# Patient Record
Sex: FEMALE | Race: WHITE | NOT HISPANIC OR LATINO | Employment: FULL TIME | URBAN - NONMETROPOLITAN AREA
[De-identification: names, ages, dates, MRNs, and addresses within clinical notes are randomized per-mention and may not be internally consistent; named-entity substitution may affect disease eponyms.]

---

## 2017-02-19 ENCOUNTER — TELEPHONE (OUTPATIENT)
Dept: URGENT CARE | Facility: CLINIC | Age: 47
End: 2017-02-19

## 2017-02-19 PROCEDURE — 87481 CANDIDA DNA AMP PROBE: CPT | Performed by: NURSE PRACTITIONER

## 2017-02-19 PROCEDURE — 87512 GARDNER VAG DNA QUANT: CPT | Performed by: NURSE PRACTITIONER

## 2017-02-19 PROCEDURE — 87661 TRICHOMONAS VAGINALIS AMPLIF: CPT | Performed by: NURSE PRACTITIONER

## 2017-05-01 ENCOUNTER — OFFICE VISIT (OUTPATIENT)
Dept: OBSTETRICS AND GYNECOLOGY | Facility: CLINIC | Age: 47
End: 2017-05-01

## 2017-05-01 ENCOUNTER — APPOINTMENT (OUTPATIENT)
Dept: LAB | Facility: HOSPITAL | Age: 47
End: 2017-05-01

## 2017-05-01 VITALS
BODY MASS INDEX: 41.48 KG/M2 | WEIGHT: 249 LBS | HEIGHT: 65 IN | DIASTOLIC BLOOD PRESSURE: 77 MMHG | SYSTOLIC BLOOD PRESSURE: 141 MMHG

## 2017-05-01 DIAGNOSIS — N91.2 AMENORRHEA: Primary | ICD-10-CM

## 2017-05-01 DIAGNOSIS — Z12.31 SCREENING MAMMOGRAM, ENCOUNTER FOR: ICD-10-CM

## 2017-05-01 DIAGNOSIS — Z01.419 ENCOUNTER FOR WELL WOMAN EXAM: ICD-10-CM

## 2017-05-01 LAB
T4 FREE SERPL-MCNC: 1.22 NG/DL (ref 0.78–2.19)
TSH SERPL DL<=0.05 MIU/L-ACNC: 2.02 MIU/ML (ref 0.46–4.68)

## 2017-05-01 PROCEDURE — 87624 HPV HI-RISK TYP POOLED RSLT: CPT | Performed by: OBSTETRICS & GYNECOLOGY

## 2017-05-01 PROCEDURE — 83002 ASSAY OF GONADOTROPIN (LH): CPT | Performed by: OBSTETRICS & GYNECOLOGY

## 2017-05-01 PROCEDURE — 36415 COLL VENOUS BLD VENIPUNCTURE: CPT | Performed by: OBSTETRICS & GYNECOLOGY

## 2017-05-01 PROCEDURE — 88142 CYTOPATH C/V THIN LAYER: CPT | Performed by: OBSTETRICS & GYNECOLOGY

## 2017-05-01 PROCEDURE — 84443 ASSAY THYROID STIM HORMONE: CPT | Performed by: OBSTETRICS & GYNECOLOGY

## 2017-05-01 PROCEDURE — 82670 ASSAY OF TOTAL ESTRADIOL: CPT | Performed by: OBSTETRICS & GYNECOLOGY

## 2017-05-01 PROCEDURE — 86376 MICROSOMAL ANTIBODY EACH: CPT | Performed by: OBSTETRICS & GYNECOLOGY

## 2017-05-01 PROCEDURE — 84439 ASSAY OF FREE THYROXINE: CPT | Performed by: OBSTETRICS & GYNECOLOGY

## 2017-05-01 PROCEDURE — 83001 ASSAY OF GONADOTROPIN (FSH): CPT | Performed by: OBSTETRICS & GYNECOLOGY

## 2017-05-01 PROCEDURE — 99386 PREV VISIT NEW AGE 40-64: CPT | Performed by: OBSTETRICS & GYNECOLOGY

## 2017-05-02 LAB
ESTRADIOL SERPL HS-MCNC: 131.3 PG/ML
FSH SERPL-ACNC: 4 MIU/ML
LH SERPL-ACNC: 4.29 MIU/ML
THYROPEROXIDASE AB SERPL-ACNC: 12 IU/ML (ref 0–34)

## 2017-05-03 ENCOUNTER — TELEPHONE (OUTPATIENT)
Dept: ENDOCRINOLOGY | Facility: CLINIC | Age: 47
End: 2017-05-03

## 2017-05-03 LAB
LAB AP CASE REPORT: NORMAL
LAB AP GYN ADDITIONAL INFORMATION: NORMAL
LAB AP GYN OTHER FINDINGS: NORMAL
Lab: NORMAL
PATH INTERP SPEC-IMP: NORMAL
STAT OF ADQ CVX/VAG CYTO-IMP: NORMAL

## 2017-05-08 LAB — HPV I/H RISK 4 DNA CVX QL PROBE+SIG AMP: NEGATIVE

## 2017-05-24 RX ORDER — LOSARTAN POTASSIUM 100 MG/1
TABLET ORAL
Qty: 90 TABLET | Refills: 2 | Status: SHIPPED | OUTPATIENT
Start: 2017-05-24 | End: 2018-01-25 | Stop reason: SDUPTHER

## 2017-05-24 RX ORDER — ATORVASTATIN CALCIUM 40 MG/1
TABLET, FILM COATED ORAL
Qty: 90 TABLET | Refills: 2 | Status: SHIPPED | OUTPATIENT
Start: 2017-05-24 | End: 2018-10-29 | Stop reason: SDUPTHER

## 2017-05-24 RX ORDER — METOPROLOL SUCCINATE 25 MG/1
TABLET, EXTENDED RELEASE ORAL
Qty: 90 TABLET | Refills: 2 | Status: SHIPPED | OUTPATIENT
Start: 2017-05-24 | End: 2018-10-29 | Stop reason: SDUPTHER

## 2017-05-30 ENCOUNTER — OFFICE VISIT (OUTPATIENT)
Dept: OBSTETRICS AND GYNECOLOGY | Facility: CLINIC | Age: 47
End: 2017-05-30

## 2017-05-30 VITALS — BODY MASS INDEX: 41.65 KG/M2 | WEIGHT: 250 LBS | HEIGHT: 65 IN

## 2017-05-30 DIAGNOSIS — Z30.430 ENCOUNTER FOR IUD INSERTION: Primary | ICD-10-CM

## 2017-05-30 LAB
B-HCG UR QL: NEGATIVE
INTERNAL NEGATIVE CONTROL: NEGATIVE
INTERNAL POSITIVE CONTROL: POSITIVE
Lab: NORMAL

## 2017-05-30 PROCEDURE — 81025 URINE PREGNANCY TEST: CPT | Performed by: OBSTETRICS & GYNECOLOGY

## 2017-05-30 PROCEDURE — 58300 INSERT INTRAUTERINE DEVICE: CPT | Performed by: OBSTETRICS & GYNECOLOGY

## 2017-06-26 ENCOUNTER — OFFICE VISIT (OUTPATIENT)
Dept: OBSTETRICS AND GYNECOLOGY | Facility: CLINIC | Age: 47
End: 2017-06-26

## 2017-06-26 VITALS
BODY MASS INDEX: 42.65 KG/M2 | WEIGHT: 256 LBS | SYSTOLIC BLOOD PRESSURE: 143 MMHG | HEIGHT: 65 IN | DIASTOLIC BLOOD PRESSURE: 79 MMHG

## 2017-06-26 DIAGNOSIS — Z30.431 IUD CHECK UP: Primary | ICD-10-CM

## 2017-06-26 PROCEDURE — 99212 OFFICE O/P EST SF 10 MIN: CPT | Performed by: OBSTETRICS & GYNECOLOGY

## 2017-06-26 NOTE — PROGRESS NOTES
"Subjective     Chief Complaint   Patient presents with   • Follow-up     Jazmine Rodriguez is a 47 y.o.  presents today for an IUD string check.  No complaints or concerns.  Spotting this morning.  No issues with intercourse.      No changes to PMH, PSH, family or social history since last visit.  No new medications or allergies.     Objective      /79  Ht 65\" (165.1 cm)  Wt 256 lb (116 kg)  BMI 42.6 kg/m2    Physical Exam  General:   Appears stated age, AAOx3, NAD   Pelvis: External genitalia - NEFG  Urethra - normal appearing, no urethra caruncle or prolapse  Vagina - normal appearing vagina, no discharge or bleeding noted, no lesions.   Cervix - Normal appearing cervix, IUD strings not initially visualized, with cytobrush, IUD strings seen right inside internal os   Neurologic: CN II - XII grossly intact       TVUS - IUD seen in endometrial cavity, near fundus    Assessment/Plan     ASSESSMENT  1. IUD check up        PLAN  1. IUD check up  - IUD strings barley visualized; bedside TVUS confirmed placement intrauterine.    - RTC in 1 year for annual exam or earlier if indicated    Emmy Tapia MD  2017           "

## 2017-08-07 RX ORDER — INSULIN HUMAN 500 [IU]/ML
INJECTION, SOLUTION SUBCUTANEOUS
Qty: 200 ML | Refills: 4 | Status: SHIPPED | OUTPATIENT
Start: 2017-08-07 | End: 2018-09-12 | Stop reason: SDUPTHER

## 2017-09-11 ENCOUNTER — APPOINTMENT (OUTPATIENT)
Dept: LAB | Facility: HOSPITAL | Age: 47
End: 2017-09-11

## 2017-09-11 PROCEDURE — 82570 ASSAY OF URINE CREATININE: CPT | Performed by: INTERNAL MEDICINE

## 2017-09-11 PROCEDURE — 84156 ASSAY OF PROTEIN URINE: CPT | Performed by: INTERNAL MEDICINE

## 2017-09-11 PROCEDURE — 85025 COMPLETE CBC W/AUTO DIFF WBC: CPT | Performed by: INTERNAL MEDICINE

## 2017-09-11 PROCEDURE — 83036 HEMOGLOBIN GLYCOSYLATED A1C: CPT | Performed by: INTERNAL MEDICINE

## 2017-09-11 PROCEDURE — 82306 VITAMIN D 25 HYDROXY: CPT | Performed by: INTERNAL MEDICINE

## 2017-09-11 PROCEDURE — 80061 LIPID PANEL: CPT | Performed by: INTERNAL MEDICINE

## 2017-09-11 PROCEDURE — 36415 COLL VENOUS BLD VENIPUNCTURE: CPT | Performed by: INTERNAL MEDICINE

## 2017-09-11 PROCEDURE — 83516 IMMUNOASSAY NONANTIBODY: CPT | Performed by: INTERNAL MEDICINE

## 2017-09-11 PROCEDURE — 82607 VITAMIN B-12: CPT | Performed by: INTERNAL MEDICINE

## 2017-09-11 PROCEDURE — 84443 ASSAY THYROID STIM HORMONE: CPT | Performed by: INTERNAL MEDICINE

## 2017-09-11 PROCEDURE — 80053 COMPREHEN METABOLIC PANEL: CPT | Performed by: INTERNAL MEDICINE

## 2017-09-11 PROCEDURE — 82043 UR ALBUMIN QUANTITATIVE: CPT | Performed by: INTERNAL MEDICINE

## 2017-09-12 ENCOUNTER — OFFICE VISIT (OUTPATIENT)
Dept: ENDOCRINOLOGY | Facility: CLINIC | Age: 47
End: 2017-09-12

## 2017-09-12 VITALS
BODY MASS INDEX: 40.55 KG/M2 | WEIGHT: 237.5 LBS | HEART RATE: 72 BPM | SYSTOLIC BLOOD PRESSURE: 130 MMHG | HEIGHT: 64 IN | DIASTOLIC BLOOD PRESSURE: 72 MMHG

## 2017-09-12 DIAGNOSIS — E55.9 VITAMIN D DEFICIENCY: ICD-10-CM

## 2017-09-12 DIAGNOSIS — E10.8 TYPE 1 DIABETES MELLITUS WITH COMPLICATION (HCC): Primary | ICD-10-CM

## 2017-09-12 DIAGNOSIS — E78.2 MIXED DIABETIC HYPERLIPIDEMIA ASSOCIATED WITH TYPE 1 DIABETES MELLITUS (HCC): Primary | ICD-10-CM

## 2017-09-12 DIAGNOSIS — E10.3399 TYPE 1 DIABETES MELLITUS WITH MODERATE NONPROLIFERATIVE RETINOPATHY, MACULAR EDEMA PRESENCE UNSPECIFIED, UNSPECIFIED LATERALITY (HCC): ICD-10-CM

## 2017-09-12 DIAGNOSIS — E11.59 HYPERTENSION ASSOCIATED WITH DIABETES (HCC): ICD-10-CM

## 2017-09-12 DIAGNOSIS — I15.2 HYPERTENSION ASSOCIATED WITH DIABETES (HCC): ICD-10-CM

## 2017-09-12 DIAGNOSIS — E04.2 NONTOXIC MULTINODULAR GOITER: ICD-10-CM

## 2017-09-12 DIAGNOSIS — E53.8 B12 DEFICIENCY: ICD-10-CM

## 2017-09-12 DIAGNOSIS — E10.69 MIXED DIABETIC HYPERLIPIDEMIA ASSOCIATED WITH TYPE 1 DIABETES MELLITUS (HCC): Primary | ICD-10-CM

## 2017-09-12 LAB
ALBUMIN SERPL-MCNC: 3.9 G/DL (ref 3.4–4.8)
ALBUMIN/GLOB SERPL: 1.2 G/DL (ref 1.1–1.8)
ALP SERPL-CCNC: 70 U/L (ref 38–126)
ALT SERPL W P-5'-P-CCNC: 30 U/L (ref 9–52)
ANION GAP SERPL CALCULATED.3IONS-SCNC: 10 MMOL/L (ref 5–15)
AST SERPL-CCNC: 27 U/L (ref 14–36)
BILIRUB SERPL-MCNC: 1.1 MG/DL (ref 0.2–1.3)
BUN BLD-MCNC: 18 MG/DL (ref 7–21)
BUN/CREAT SERPL: 15.4 (ref 7–25)
CALCIUM SPEC-SCNC: 9.3 MG/DL (ref 8.4–10.2)
CHLORIDE SERPL-SCNC: 101 MMOL/L (ref 95–110)
CO2 SERPL-SCNC: 27 MMOL/L (ref 22–31)
CREAT BLD-MCNC: 1.17 MG/DL (ref 0.5–1)
GFR SERPL CREATININE-BSD FRML MDRD: 50 ML/MIN/1.73 (ref 60–135)
GLOBULIN UR ELPH-MCNC: 3.3 GM/DL (ref 2.3–3.5)
GLUCOSE BLD-MCNC: 109 MG/DL (ref 60–100)
GLUCOSE BLDC GLUCOMTR-MCNC: 149 MG/DL (ref 70–130)
POTASSIUM BLD-SCNC: 4.2 MMOL/L (ref 3.5–5.1)
PROT SERPL-MCNC: 7.2 G/DL (ref 6.3–8.6)
SODIUM BLD-SCNC: 138 MMOL/L (ref 137–145)

## 2017-09-12 PROCEDURE — PTNOCHG PR CUSTOM PT NO CHARGE VISIT: Performed by: INTERNAL MEDICINE

## 2017-09-12 PROCEDURE — 95250 CONT GLUC MNTR PHYS/QHP EQP: CPT | Performed by: INTERNAL MEDICINE

## 2017-09-12 PROCEDURE — 99214 OFFICE O/P EST MOD 30 MIN: CPT | Performed by: INTERNAL MEDICINE

## 2017-09-12 NOTE — PROGRESS NOTES
Jazmine Rodriguez is a 47 y.o. female seen by diabetes educator 09/12/2017 for insertion of Joanie diagnostic continuous glucose monitor.     Sensor inserted in office. Instructed patient to wear sensor up to 14 days. Patient is to return to clinic in 2 weeks for sensor removal and results. Instructed patient to remove sensor if he has a CT scan, MRI, or X-ray, or if skin irritation occurs.     Aissatou Young MS, RD, LD, CDE

## 2017-09-12 NOTE — PROGRESS NOTES
Jazmine Rodriguez is a 47 y.o. female who presents for  evaluation of   Chief Complaint   Patient presents with   • Diabetes         Primary Care / Referring Provider  Roderick Allred MD      History of Present Illness  followup for thyroid nodule and  diabetes type 1    In regards to thyroid nodule     Duration - diagnosed 40s  Timing - constant  Quality -   controlled  Severity - moderate    Previous Imaging -  last US from Nov 2016 right sup 1.1. cm thyroid nodule , stable when compared to 2013    TSH - Normal     She refers Hashimoto's , TPO ab neg . She is euthyroid.     ---    Type 1 DM    Duration since 10 y   Timing constant  Quality uncontrolled  Severity high - diabetic retinopathy and ckd  Alleviating Factors - Insulin, through insulin pump   Associated symptoms - fatigue,    BG readings, pump download - variable bg readings      now exercising, had vitreous hemorrhage related in part to aspirin        Past Medical History:   Diagnosis Date   • Acute otitis media with effusion    • Acute renal failure syndrome    • Acute sinusitis    • Acute upper respiratory infection, unspecified    • Allergic rhinitis    • Astigmatism    • Borderline glaucoma    • 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    • Diabetic retinopathy     prob PDR OD   • Dyslipidemia    • Essential hypertension    • GERD (gastroesophageal reflux disease)    • Non-toxic multinodular goiter    • Nonproliferative diabetic retinopathy     possible occult PDR OD      • Puncture wound without foreign body, right lower leg, initial encounter    • Referred otalgia    • Sinus headache    • Type 1 diabetes mellitus    • Type 2 diabetes mellitus    • Upper respiratory infection    • Vitreous hemorrhage     s/p vitrectomy OD, doing well        Family History   Problem Relation Age of Onset   • Cancer Paternal Grandfather      Colorectal Cancer   • Diabetes Other    • Hypertension Other    • Thyroid disease Other    •  Breast cancer Mother    • Breast cancer Paternal Aunt      Social History   Substance Use Topics   • Smoking status: Never Smoker   • Smokeless tobacco: Never Used   • Alcohol use Not on file         Current Outpatient Prescriptions:   •  albuterol (PROVENTIL HFA;VENTOLIN HFA) 108 (90 BASE) MCG/ACT inhaler, Inhale 2 puffs Every 6 (Six) Hours As Needed for Wheezing., Disp: 1 inhaler, Rfl: 0  •  atorvastatin (LIPITOR) 40 MG tablet, TAKE ONE TABLET BY MOUTH DAILY, Disp: 90 tablet, Rfl: 2  •  azithromycin (ZITHROMAX) 250 MG tablet, Take 2 tablets the first day, then 1 tablet daily for 4 days., Disp: 6 tablet, Rfl: 0  •  HUMULIN R 500 UNIT/ML CONCENTRATED injection, INJECT 1 TO 1.3 ML VIA PUMP ONCE DAILY, Disp: 200 mL, Rfl: 4  •  insulin regular (HUMULIN R) 500 UNIT/ML CONCENTRATED injection, Inject  under the skin 3 (Three) Times a Day Before Meals., Disp: , Rfl:   •  insulin regular (HumuLIN R) 500 UNIT/ML patient supplied pump, Inject 500 Units under the skin Continuous. Per Insulin Pump, Disp: , Rfl:   •  levonorgestrel (MIRENA) 20 MCG/24HR IUD, 1 each by Intrauterine route 1 (One) Time., Disp: , Rfl:   •  losartan (COZAAR) 100 MG tablet, TAKE ONE TABLET BY MOUTH DAILY, Disp: 90 tablet, Rfl: 2  •  metoprolol succinate XL (TOPROL-XL) 25 MG 24 hr tablet, TAKE ONE TABLET BY MOUTH DAILY, Disp: 90 tablet, Rfl: 2    Review of Systems    Review of Systems   Constitutional: Negative for activity change, appetite change, chills, diaphoresis, fatigue, fever and unexpected weight change.   HENT: Negative for congestion, drooling, ear discharge, ear pain, facial swelling, mouth sores, nosebleeds, postnasal drip, sinus pressure, sneezing, sore throat, tinnitus, trouble swallowing and voice change.    Eyes: Negative.  Negative for photophobia, pain, discharge, redness and itching.   Respiratory: Negative.  Negative for apnea, cough, choking, chest tightness, shortness of breath, wheezing and stridor.    Cardiovascular: Negative.   "Negative for chest pain, palpitations and leg swelling.   Gastrointestinal: Negative.  Negative for abdominal distention, abdominal pain, constipation, diarrhea, nausea and vomiting.   Endocrine: Negative.  Negative for cold intolerance, heat intolerance, polydipsia, polyphagia and polyuria.   Genitourinary: Negative for difficulty urinating, dysuria, flank pain and frequency.   Musculoskeletal: Negative for arthralgias, back pain, gait problem, joint swelling, myalgias, neck pain and neck stiffness.   Skin: Negative for color change, pallor, rash and wound.   Allergic/Immunologic: Negative for environmental allergies, food allergies and immunocompromised state.   Neurological: Negative for dizziness, tremors, seizures, syncope, facial asymmetry, speech difficulty, weakness, light-headedness, numbness and headaches.   Hematological: Negative for adenopathy. Does not bruise/bleed easily.   Psychiatric/Behavioral: Negative for agitation, behavioral problems, confusion, decreased concentration, dysphoric mood, hallucinations, self-injury, sleep disturbance and suicidal ideas. The patient is not nervous/anxious and is not hyperactive.         Objective:     /72 (BP Location: Right arm, Patient Position: Sitting, Cuff Size: Adult)  Pulse 72  Ht 64\" (162.6 cm)  Wt 237 lb 8 oz (108 kg)  BMI 40.77 kg/m2    Physical Exam   Constitutional: She is oriented to person, place, and time. She appears well-developed.   HENT:   Head: Normocephalic.   Right Ear: External ear normal.   Left Ear: External ear normal.   Nose: Nose normal.   Eyes: Conjunctivae and EOM are normal. No scleral icterus.   Neck: Normal range of motion. Neck supple. No tracheal deviation present. No thyromegaly present.   Cardiovascular: Normal rate, regular rhythm, normal heart sounds and intact distal pulses.  Exam reveals no gallop and no friction rub.    No murmur heard.  Pulmonary/Chest: Effort normal and breath sounds normal. No stridor. No " respiratory distress. She has no wheezes. She has no rales. She exhibits no tenderness.   Abdominal: Soft. Bowel sounds are normal. She exhibits no distension and no mass. There is no tenderness. There is no rebound and no guarding.   Musculoskeletal: Normal range of motion. She exhibits no tenderness or deformity.   Lymphadenopathy:     She has no cervical adenopathy.   Neurological: She is alert and oriented to person, place, and time. She displays normal reflexes. She exhibits normal muscle tone. Coordination normal.   Skin: No rash noted. No erythema. No pallor.   Psychiatric: She has a normal mood and affect. Her behavior is normal. Judgment and thought content normal.       Lab Review    Results for orders placed or performed in visit on 09/12/17   POC Glucose Fingerstick   Result Value Ref Range    Glucose 149 (A) 70 - 130 mg/dL           Assessment/Plan       ICD-10-CM ICD-9-CM   1. Mixed diabetic hyperlipidemia associated with type 1 diabetes mellitus E10.69 250.81    E78.2 272.2   2. Type 1 diabetes mellitus with moderate nonproliferative retinopathy, macular edema presence unspecified, unspecified laterality E10.3399 250.51     362.05   3. Nontoxic multinodular goiter E04.2 241.1   4. Hypertension associated with diabetes E11.59 250.80    I10 401.9   5. B12 deficiency E53.8 266.2   6. Vitamin D deficiency E55.9 268.9       Glycemic Management:      Lab Results   Component Value Date    HGBA1C 8.6 (H) 09/11/2017    HGBA1C 9.0 (H) 10/28/2016    HGBA1C 9.1 (H) 06/20/2016     Lab Results   Component Value Date    MICROALBUR 1.4 09/11/2017    LDLCALC 109 10/28/2016    CREATININE 1.1 (H) 10/28/2016           pump with  U 500 insulin     Basal insulin     MN 0.55 - decrease to 0.525 - 0.5- 0.45  2:30 a.m. 0.65 decrease to 0.625- 0.6 - 0.575- 0.5  7 a.m. 0.5 - 0.475-0.5      Sensitivity 90 - 70     carb ratio  u500 at 20 --- change to 18 - 16 - 15 - 12 - 10     now do 16 up to 5 p.m. - keep -14 - 13 - 13 -11  at  5 p.m. 19 -- - change to 16 -14 - 13 - 11     sens 90 -75- 70     she has ARLENE and CPAP may help to decrease overnight BG rise    symlin not effective, she stopped  victoza resulted in profound hypoglycemia     start farxiga 5 mg daily - not covered- on invokana - not taking       sensor not accurate - but will talk to Sara      main problem is post meals rise , we will try afrezza, there is lipohypertrophy     Exercising and little carbs    So small bolus   Lipid Management    Lab Results   Component Value Date    CHOL 152 09/11/2017     Lab Results   Component Value Date    TRIG 96 09/11/2017    TRIG 88 10/28/2016    TRIG 129 06/20/2016     Lab Results   Component Value Date    HDL 34 (L) 09/11/2017    HDL 51 (L) 10/28/2016    HDL 31 (L) 06/20/2016     Lab Results   Component Value Date    LDLDIRECT 85 09/11/2017         Lipitor 40 mg tablet, 1 tablet(s) by mouth daily taking     continue    Lipids at goal    Blood Pressure Management:     Wt Readings from Last 3 Encounters:   09/12/17 237 lb 8 oz (108 kg)   07/19/17 248 lb 2 oz (113 kg)   06/26/17 256 lb (116 kg)     Temp Readings from Last 3 Encounters:   07/19/17 100.1 °F (37.8 °C) (Oral)   03/16/17 98.1 °F (36.7 °C)   02/19/17 98 °F (36.7 °C) (Tympanic)     BP Readings from Last 3 Encounters:   09/12/17 130/72   07/19/17 122/60   06/26/17 143/79     Pulse Readings from Last 3 Encounters:   09/12/17 72   07/19/17 88   03/16/17 89         cozaar 100 mg daily     metoprolol succinate ER 25 mg tablet,extended release 24 hr, 1 tab daily taking   --    no hctz since Acute on chronic kidney disease    Microvascular Complication Monitoring:  No Microalbuminuria,  nephropathy ckd stage III    no neuropathy    ==    had vitreous hemorrhage, stop aspirin   Immunizations:  Last influenza immunization 2015, Last pneumococcal immunization has had pneumovax before age 65  Preventive Care:  Patient is not smoking  Weight Related:  Obesity, Counseled on nutrition, Counseled on  physical activity  Sleep apnea positive    she will have cpap titration    should use compression stockings    --  start contrave - not effective    exercising and limiting carbs but still not able to lose weight    start saxenda - not covered      Uncontrolled diabetes  Hypoglycemia and Hyperglycemia    Insertion of continuous glucose monitor to define pattern    The continuous glucose monitor that was inserted is a amaury glucose monitor          [facility]   nl vit D   Other Diabetes Related Aspects  Hashimoto's - no since tpo neg  MNG with subcm nodules    US personally reviewed , stable from May 2013 to Nov 2016       Lab Results   Component Value Date    TSH 1.720 09/11/2017             Lab Results   Component Value Date    [id number] 387 09/11/2017         5-14 no celiac disease  all rx to refill    3 months at a [facility]      I reviewed and summarized records from Roderick Allred MD from 2016 and I reviewed / ordered labs.     Orders Placed This Encounter   Procedures   • Comprehensive Metabolic Panel   • POC Glucose Fingerstick         A copy of my note was sent to Roderick Allred MD    Please see my above opinion and suggestions.

## 2017-09-15 ENCOUNTER — OFFICE VISIT (OUTPATIENT)
Dept: ENDOCRINOLOGY | Facility: CLINIC | Age: 47
End: 2017-09-15

## 2017-09-15 DIAGNOSIS — E10.8 TYPE 1 DIABETES MELLITUS WITH COMPLICATION (HCC): Primary | ICD-10-CM

## 2017-09-15 PROCEDURE — PTNOCHG PR CUSTOM PT NO CHARGE VISIT: Performed by: INTERNAL MEDICINE

## 2017-09-15 NOTE — PROGRESS NOTES
Jazmine Rodriguez is a 47 y.o. female seen by diabetes educator 09/15/2017 for insertion of Joanie diagnostic continuous glucose monitor. This was a second insertion because first sensor came off.     Sensor inserted in office. Instructed patient to wear sensor up to 14 days. Patient is to return to clinic in 2 weeks for sensor removal and results. Instructed patient to remove sensor if he has a CT scan, MRI, or X-ray, or if skin irritation occurs.     Aissatou Young MS, RD, LD, CDE

## 2017-09-26 ENCOUNTER — OFFICE VISIT (OUTPATIENT)
Dept: ENDOCRINOLOGY | Facility: CLINIC | Age: 47
End: 2017-09-26

## 2017-09-26 VITALS
BODY MASS INDEX: 40.97 KG/M2 | HEIGHT: 64 IN | WEIGHT: 240 LBS | SYSTOLIC BLOOD PRESSURE: 118 MMHG | DIASTOLIC BLOOD PRESSURE: 80 MMHG | HEART RATE: 68 BPM

## 2017-09-26 DIAGNOSIS — E10.3399 TYPE 1 DIABETES MELLITUS WITH MODERATE NONPROLIFERATIVE RETINOPATHY, MACULAR EDEMA PRESENCE UNSPECIFIED, UNSPECIFIED LATERALITY (HCC): Primary | ICD-10-CM

## 2017-09-26 DIAGNOSIS — E11.59 HYPERTENSION ASSOCIATED WITH DIABETES (HCC): ICD-10-CM

## 2017-09-26 DIAGNOSIS — I15.2 HYPERTENSION ASSOCIATED WITH DIABETES (HCC): ICD-10-CM

## 2017-09-26 DIAGNOSIS — E55.9 VITAMIN D DEFICIENCY: ICD-10-CM

## 2017-09-26 DIAGNOSIS — E04.2 NONTOXIC MULTINODULAR GOITER: ICD-10-CM

## 2017-09-26 PROCEDURE — 95251 CONT GLUC MNTR ANALYSIS I&R: CPT | Performed by: NURSE PRACTITIONER

## 2017-09-26 PROCEDURE — 99214 OFFICE O/P EST MOD 30 MIN: CPT | Performed by: NURSE PRACTITIONER

## 2017-09-26 NOTE — PROGRESS NOTES
Subjective    Jazmine Rodriguez is a 47 y.o. female. she is here today for follow-up.    History of Present Illness        History of Present Illness      followup for thyroid nodule and  diabetes type 1     In regards to thyroid nodule      Duration - diagnosed 40s  Timing - constant  Quality -   controlled  Severity - moderate     Previous Imaging -  last US from Nov 2016 right sup 1.1. cm thyroid nodule , stable when compared to 2013     TSH - Normal      She refers Hashimoto's , TPO ab neg . She is euthyroid.      ---     Type 1 DM     Duration since 10 y   Timing constant  Quality uncontrolled  Severity high - diabetic retinopathy and ckd  Alleviating Factors - Insulin, through insulin pump   Associated symptoms - fatigue,     BG readings, pump download - variable bg readings    Joanie downloaded average 165         now exercising, had vitreous hemorrhage related in part to aspirin             The following portions of the patient's history were reviewed and updated as appropriate:   Past Medical History:   Diagnosis Date   • Acute otitis media with effusion    • Acute renal failure syndrome    • Acute sinusitis    • Acute upper respiratory infection, unspecified    • Allergic rhinitis    • Astigmatism    • Borderline glaucoma    • 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    • Diabetic retinopathy     prob PDR OD   • Dyslipidemia    • Essential hypertension    • GERD (gastroesophageal reflux disease)    • Non-toxic multinodular goiter    • Nonproliferative diabetic retinopathy     possible occult PDR OD      • Puncture wound without foreign body, right lower leg, initial encounter    • Referred otalgia    • Sinus headache    • Type 1 diabetes mellitus    • Type 2 diabetes mellitus    • Upper respiratory infection    • Vitreous hemorrhage     s/p vitrectomy OD, doing well        Past Surgical History:   Procedure Laterality Date   • BLADDER SURGERY Bilateral 07/03/1973    Bilateral refulx,  with progressive deterioration of upper tracts.   •  SECTION  2009    Emergent primary low transverse  section. Intrauterine pregnancy. Chronic hypertension. Superimposed preecalmpsia. Insulin dependent diabetes.   • CYSTOSCOPY  1972    And internal urethrotomy. Bilateral reflux with Walker's membrane.   • INJECTION OF MEDICATION  2016    Kenalog (2)     • INJECTION OF MEDICATION  10/23/2015    Toradol (1)    • PAP SMEAR  2009    Negative     Family History   Problem Relation Age of Onset   • Cancer Paternal Grandfather      Colorectal Cancer   • Diabetes Other    • Hypertension Other    • Thyroid disease Other    • Breast cancer Mother    • Breast cancer Paternal Aunt      OB History      Para Term  AB Living    1 1 1       SAB TAB Ectopic Multiple Live Births                Current Outpatient Prescriptions   Medication Sig Dispense Refill   • albuterol (PROVENTIL HFA;VENTOLIN HFA) 108 (90 BASE) MCG/ACT inhaler Inhale 2 puffs Every 6 (Six) Hours As Needed for Wheezing. 1 inhaler 0   • atorvastatin (LIPITOR) 40 MG tablet TAKE ONE TABLET BY MOUTH DAILY 90 tablet 2   • HUMULIN R 500 UNIT/ML CONCENTRATED injection INJECT 1 TO 1.3 ML VIA PUMP ONCE DAILY 200 mL 4   • insulin regular (HUMULIN R) 500 UNIT/ML CONCENTRATED injection Inject  under the skin 3 (Three) Times a Day Before Meals.     • insulin regular (HumuLIN R) 500 UNIT/ML patient supplied pump Inject 500 Units under the skin Continuous. Per Insulin Pump     • levonorgestrel (MIRENA) 20 MCG/24HR IUD 1 each by Intrauterine route 1 (One) Time.     • losartan (COZAAR) 100 MG tablet TAKE ONE TABLET BY MOUTH DAILY 90 tablet 2   • metoprolol succinate XL (TOPROL-XL) 25 MG 24 hr tablet TAKE ONE TABLET BY MOUTH DAILY 90 tablet 2     No current facility-administered medications for this visit.      Allergies   Allergen Reactions   • Sulfa Antibiotics Shortness Of Breath     Social History     Social History   • Marital  "status:      Spouse name: N/A   • Number of children: N/A   • Years of education: N/A     Social History Main Topics   • Smoking status: Never Smoker   • Smokeless tobacco: Never Used   • Alcohol use None   • Drug use: None   • Sexual activity: Not Asked     Other Topics Concern   • None     Social History Narrative       Review of Systems  Review of Systems   Constitutional: Negative for activity change, appetite change, diaphoresis and fatigue.   HENT: Negative for congestion, dental problem, drooling, facial swelling, sneezing, sore throat, tinnitus, trouble swallowing and voice change.    Eyes: Negative for photophobia, pain, discharge, redness, itching and visual disturbance.   Respiratory: Negative for apnea, cough, choking, chest tightness and shortness of breath.    Cardiovascular: Negative for chest pain, palpitations and leg swelling.   Gastrointestinal: Negative for abdominal distention, abdominal pain, constipation, diarrhea, nausea and vomiting.   Endocrine: Negative for cold intolerance, heat intolerance, polydipsia, polyphagia and polyuria.   Genitourinary: Negative for difficulty urinating, dysuria, frequency, hematuria and urgency.   Musculoskeletal: Negative for arthralgias, back pain, gait problem, joint swelling, myalgias, neck pain and neck stiffness.   Skin: Negative for color change, pallor, rash and wound.   Allergic/Immunologic: Negative for environmental allergies, food allergies and immunocompromised state.   Neurological: Negative for dizziness, tremors, seizures, facial asymmetry, speech difficulty, weakness, light-headedness, numbness and headaches.   Hematological: Negative for adenopathy. Does not bruise/bleed easily.   Psychiatric/Behavioral: Negative for agitation, behavioral problems, confusion and sleep disturbance. The patient is not nervous/anxious.         Objective    /80 (BP Location: Left arm, Patient Position: Sitting, Cuff Size: Adult)  Pulse 68  Ht 64\" " (162.6 cm)  Wt 240 lb (109 kg)  BMI 41.2 kg/m2  Physical Exam   Constitutional: She is oriented to person, place, and time. She appears well-developed and well-nourished. No distress.   HENT:   Head: Normocephalic and atraumatic.   Right Ear: External ear normal.   Left Ear: External ear normal.   Nose: Nose normal.   Eyes: Conjunctivae and EOM are normal. Pupils are equal, round, and reactive to light.   Neck: Normal range of motion. Neck supple. No tracheal deviation present. No thyromegaly present.   Cardiovascular: Normal rate, regular rhythm and normal heart sounds.    No murmur heard.  Pulmonary/Chest: Effort normal and breath sounds normal. No respiratory distress. She has no wheezes.   Abdominal: Soft. Bowel sounds are normal. There is no tenderness. There is no rebound and no guarding.   Musculoskeletal: Normal range of motion. She exhibits no edema, tenderness or deformity.   Neurological: She is alert and oriented to person, place, and time. No cranial nerve deficit.   Skin: Skin is warm and dry. No rash noted.   Psychiatric: She has a normal mood and affect. Her behavior is normal. Judgment and thought content normal.       Lab Review  Glucose   Date Value   09/11/2017 109 mg/dL (H)   10/28/2016 146 mg/dl (H)   06/20/2016 170 mg/dl (H)   03/17/2016 252 mg/dl (H)     Sodium (mmol/L)   Date Value   09/11/2017 138   10/28/2016 140   06/20/2016 139   03/17/2016 137     Potassium (mmol/L)   Date Value   09/11/2017 4.2   10/28/2016 4.2   06/20/2016 4.3   03/17/2016 4.3     Chloride (mmol/L)   Date Value   09/11/2017 101   10/28/2016 100   06/20/2016 100   03/17/2016 99     CO2 (mmol/L)   Date Value   09/11/2017 27.0   10/28/2016 33 (H)   06/20/2016 29   03/17/2016 28     BUN   Date Value   09/11/2017 18 mg/dL   10/28/2016 19 mg/dl   06/20/2016 13 mg/dl   03/17/2016 16 mg/dl     Creatinine   Date Value   09/11/2017 1.17 mg/dL (H)   10/28/2016 1.1 mg/dl (H)   06/20/2016 1.1 mg/dl (H)   03/17/2016 1.0 mg/dl      Hemoglobin A1C   Date Value   09/11/2017 8.6 % (H)   10/28/2016 9.0 %TotHgb (H)   06/20/2016 9.1 %TotHgb (H)   03/17/2016 9.7 %TotHgb (H)     Triglycerides   Date Value   09/11/2017 96 mg/dL   10/28/2016 88 mg/dl   06/20/2016 129 mg/dl   03/17/2016 82 mg/dl       Assessment/Plan      1. Type 1 diabetes mellitus with moderate nonproliferative retinopathy, macular edema presence unspecified, unspecified laterality    2. Hypertension associated with diabetes    3. Nontoxic multinodular goiter    4. Vitamin D deficiency    .    Medications prescribed:  Outpatient Encounter Prescriptions as of 9/26/2017   Medication Sig Dispense Refill   • albuterol (PROVENTIL HFA;VENTOLIN HFA) 108 (90 BASE) MCG/ACT inhaler Inhale 2 puffs Every 6 (Six) Hours As Needed for Wheezing. 1 inhaler 0   • atorvastatin (LIPITOR) 40 MG tablet TAKE ONE TABLET BY MOUTH DAILY 90 tablet 2   • HUMULIN R 500 UNIT/ML CONCENTRATED injection INJECT 1 TO 1.3 ML VIA PUMP ONCE DAILY 200 mL 4   • insulin regular (HUMULIN R) 500 UNIT/ML CONCENTRATED injection Inject  under the skin 3 (Three) Times a Day Before Meals.     • insulin regular (HumuLIN R) 500 UNIT/ML patient supplied pump Inject 500 Units under the skin Continuous. Per Insulin Pump     • levonorgestrel (MIRENA) 20 MCG/24HR IUD 1 each by Intrauterine route 1 (One) Time.     • losartan (COZAAR) 100 MG tablet TAKE ONE TABLET BY MOUTH DAILY 90 tablet 2   • metoprolol succinate XL (TOPROL-XL) 25 MG 24 hr tablet TAKE ONE TABLET BY MOUTH DAILY 90 tablet 2   • [DISCONTINUED] azithromycin (ZITHROMAX) 250 MG tablet Take 2 tablets the first day, then 1 tablet daily for 4 days. 6 tablet 0     No facility-administered encounter medications on file as of 9/26/2017.        Orders placed during this encounter include:  No orders of the defined types were placed in this encounter.      Glycemic management     Joanie sensor downloaded and reviewed    Sept 15 - 22, 2017     Average 165    Estimated A1c 7.4%     8  low events - occurred in the middle of the night     Under a lot of stress - father is sick         pump with  U 500 insulin      Basal insulin      MN 0.55 - decrease to 0.525 - 0.5- 0.45---decrease to 0.40  2:30 a.m. 0.65 decrease to 0.625- 0.6 - 0.575- 0.5- decrease to 0.45  7 a.m. 0.5 - 0.475-0.5        Sensitivity 90 - 70      carb ratio  u500 at 20 --- change to 18 - 16 - 15 - 12 - 10      now do 16 up to 5 p.m. - keep -14 - 13 - 13 -11  at 5 p.m. 19 -- - change to 16 -14 - 13 - 11      sens 90 -75- 70      she has ARLENE and CPAP may help to decrease overnight BG rise     symlin not effective, she stopped  victoza resulted in profound hypoglycemia      start farxiga 5 mg daily - not covered- on invokana - not taking         sensor not accurate - but will talk to Sara        main problem is post meals rise , we will try afrezza, there is lipohypertrophy      Exercising and little carbs     So small bolus   Lipid Management           Lab Results   Component Value Date     CHOL 152 09/11/2017            Lab Results   Component Value Date     TRIG 96 09/11/2017     TRIG 88 10/28/2016     TRIG 129 06/20/2016            Lab Results   Component Value Date     HDL 34 (L) 09/11/2017     HDL 51 (L) 10/28/2016     HDL 31 (L) 06/20/2016            Lab Results   Component Value Date     LDLDIRECT 85 09/11/2017            Lipitor 40 mg tablet, 1 tablet(s) by mouth daily taking      continue     Lipids at goal     Blood Pressure Management:          Wt Readings from Last 3 Encounters:   09/12/17 237 lb 8 oz (108 kg)   07/19/17 248 lb 2 oz (113 kg)   06/26/17 256 lb (116 kg)          Temp Readings from Last 3 Encounters:   07/19/17 100.1 °F (37.8 °C) (Oral)   03/16/17 98.1 °F (36.7 °C)   02/19/17 98 °F (36.7 °C) (Tympanic)          BP Readings from Last 3 Encounters:   09/12/17 130/72   07/19/17 122/60   06/26/17 143/79          Pulse Readings from Last 3 Encounters:   09/12/17 72   07/19/17 88   03/16/17 89            eric  100 mg daily      metoprolol succinate ER 25 mg tablet,extended release 24 hr, 1 tab daily taking   --     no hctz since Acute on chronic kidney disease     Microvascular Complication Monitoring:  No Microalbuminuria,  nephropathy ckd stage III     no neuropathy     ==     had vitreous hemorrhage, stop aspirin   Immunizations:  Last influenza immunization 2015, Last pneumococcal immunization has had pneumovax before age 65  Preventive Care:  Patient is not smoking  Weight Related:  Obesity, Counseled on nutrition, Counseled on physical activity  Sleep apnea positive     she will have cpap titration     should use compression stockings     --  start contrave - not effective     exercising and limiting carbs but still not able to lose weight     start saxenda - not covered                      [facility]   nl vit D   Other Diabetes Related Aspects  Hashimoto's - no since tpo neg  MNG with subcm nodules     US personally reviewed , stable from May 2013 to Nov 2016         Lab Results   Component Value Date     TSH 1.720 09/11/2017                        Lab Results   Component Value Date     [id number] 387 09/11/2017            5-14 no celiac disease  all rx to refill     3 months at a tinme           4. Follow-up: Return for Recheck.

## 2018-01-25 RX ORDER — LOSARTAN POTASSIUM 100 MG/1
100 TABLET ORAL DAILY
Qty: 90 TABLET | Refills: 1 | Status: SHIPPED | OUTPATIENT
Start: 2018-01-25 | End: 2018-06-17 | Stop reason: SDUPTHER

## 2018-03-21 ENCOUNTER — APPOINTMENT (OUTPATIENT)
Dept: LAB | Facility: HOSPITAL | Age: 48
End: 2018-03-21

## 2018-03-21 LAB
25(OH)D3 SERPL-MCNC: 50.1 NG/ML (ref 30–100)
ALBUMIN SERPL-MCNC: 4 G/DL (ref 3.4–4.8)
ALBUMIN UR-MCNC: <0.6 MG/L
ALBUMIN/GLOB SERPL: 1.3 G/DL (ref 1.1–1.8)
ALP SERPL-CCNC: 93 U/L (ref 38–126)
ALT SERPL W P-5'-P-CCNC: 37 U/L (ref 9–52)
ANION GAP SERPL CALCULATED.3IONS-SCNC: 16 MMOL/L (ref 5–15)
ARTICHOKE IGE QN: 87 MG/DL (ref 1–129)
AST SERPL-CCNC: 32 U/L (ref 14–36)
BASOPHILS # BLD AUTO: 0.05 10*3/MM3 (ref 0–0.2)
BASOPHILS NFR BLD AUTO: 0.6 % (ref 0–2)
BILIRUB SERPL-MCNC: 0.9 MG/DL (ref 0.2–1.3)
BUN BLD-MCNC: 22 MG/DL (ref 7–21)
BUN/CREAT SERPL: 20.2 (ref 7–25)
CALCIUM SPEC-SCNC: 9.1 MG/DL (ref 8.4–10.2)
CHLORIDE SERPL-SCNC: 99 MMOL/L (ref 95–110)
CHOLEST SERPL-MCNC: 155 MG/DL (ref 0–199)
CO2 SERPL-SCNC: 25 MMOL/L (ref 22–31)
CREAT BLD-MCNC: 1.09 MG/DL (ref 0.5–1)
CREAT UR-MCNC: 98.8 MG/DL
DEPRECATED RDW RBC AUTO: 38.3 FL (ref 36.4–46.3)
EOSINOPHIL # BLD AUTO: 0.44 10*3/MM3 (ref 0–0.7)
EOSINOPHIL NFR BLD AUTO: 5.1 % (ref 0–7)
ERYTHROCYTE [DISTWIDTH] IN BLOOD BY AUTOMATED COUNT: 12.4 % (ref 11.5–14.5)
GFR SERPL CREATININE-BSD FRML MDRD: 54 ML/MIN/1.73 (ref 58–135)
GLOBULIN UR ELPH-MCNC: 3.2 GM/DL (ref 2.3–3.5)
GLUCOSE BLD-MCNC: 264 MG/DL (ref 60–100)
HBA1C MFR BLD: 9.4 % (ref 4–5.6)
HCT VFR BLD AUTO: 37.8 % (ref 35–45)
HDLC SERPL-MCNC: 35 MG/DL (ref 60–200)
HGB BLD-MCNC: 13.1 G/DL (ref 12–15.5)
IMM GRANULOCYTES # BLD: 0.02 10*3/MM3 (ref 0–0.02)
IMM GRANULOCYTES NFR BLD: 0.2 % (ref 0–0.5)
LDLC/HDLC SERPL: 2.61 {RATIO} (ref 0–3.22)
LYMPHOCYTES # BLD AUTO: 2.56 10*3/MM3 (ref 0.6–4.2)
LYMPHOCYTES NFR BLD AUTO: 29.9 % (ref 10–50)
MCH RBC QN AUTO: 29.6 PG (ref 26.5–34)
MCHC RBC AUTO-ENTMCNC: 34.7 G/DL (ref 31.4–36)
MCV RBC AUTO: 85.5 FL (ref 80–98)
MICROALBUMIN/CREAT UR: NORMAL MG/G (ref 0–30)
MONOCYTES # BLD AUTO: 0.41 10*3/MM3 (ref 0–0.9)
MONOCYTES NFR BLD AUTO: 4.8 % (ref 0–12)
NEUTROPHILS # BLD AUTO: 5.07 10*3/MM3 (ref 2–8.6)
NEUTROPHILS NFR BLD AUTO: 59.4 % (ref 37–80)
PLATELET # BLD AUTO: 235 10*3/MM3 (ref 150–450)
PMV BLD AUTO: 10.2 FL (ref 8–12)
POTASSIUM BLD-SCNC: 4.6 MMOL/L (ref 3.5–5.1)
PROT SERPL-MCNC: 7.2 G/DL (ref 6.3–8.6)
RBC # BLD AUTO: 4.42 10*6/MM3 (ref 3.77–5.16)
SODIUM BLD-SCNC: 140 MMOL/L (ref 137–145)
TRIGL SERPL-MCNC: 143 MG/DL (ref 20–199)
TSH SERPL DL<=0.05 MIU/L-ACNC: 1.88 MIU/ML (ref 0.46–4.68)
VIT B12 BLD-MCNC: 408 PG/ML (ref 239–931)
WBC NRBC COR # BLD: 8.55 10*3/MM3 (ref 3.2–9.8)

## 2018-03-21 PROCEDURE — 82306 VITAMIN D 25 HYDROXY: CPT | Performed by: INTERNAL MEDICINE

## 2018-03-21 PROCEDURE — 36415 COLL VENOUS BLD VENIPUNCTURE: CPT | Performed by: INTERNAL MEDICINE

## 2018-03-21 PROCEDURE — 80061 LIPID PANEL: CPT | Performed by: INTERNAL MEDICINE

## 2018-03-21 PROCEDURE — 85025 COMPLETE CBC W/AUTO DIFF WBC: CPT | Performed by: INTERNAL MEDICINE

## 2018-03-21 PROCEDURE — 80053 COMPREHEN METABOLIC PANEL: CPT | Performed by: INTERNAL MEDICINE

## 2018-03-21 PROCEDURE — 83036 HEMOGLOBIN GLYCOSYLATED A1C: CPT | Performed by: INTERNAL MEDICINE

## 2018-03-21 PROCEDURE — 84443 ASSAY THYROID STIM HORMONE: CPT | Performed by: INTERNAL MEDICINE

## 2018-03-21 PROCEDURE — 82043 UR ALBUMIN QUANTITATIVE: CPT | Performed by: INTERNAL MEDICINE

## 2018-03-21 PROCEDURE — 82607 VITAMIN B-12: CPT | Performed by: INTERNAL MEDICINE

## 2018-03-21 PROCEDURE — 82570 ASSAY OF URINE CREATININE: CPT | Performed by: INTERNAL MEDICINE

## 2018-03-28 ENCOUNTER — OFFICE VISIT (OUTPATIENT)
Dept: ENDOCRINOLOGY | Facility: CLINIC | Age: 48
End: 2018-03-28

## 2018-03-28 VITALS
DIASTOLIC BLOOD PRESSURE: 80 MMHG | SYSTOLIC BLOOD PRESSURE: 126 MMHG | OXYGEN SATURATION: 98 % | BODY MASS INDEX: 41.8 KG/M2 | WEIGHT: 251.2 LBS | HEART RATE: 82 BPM

## 2018-03-28 DIAGNOSIS — E10.69 MIXED DIABETIC HYPERLIPIDEMIA ASSOCIATED WITH TYPE 1 DIABETES MELLITUS (HCC): ICD-10-CM

## 2018-03-28 DIAGNOSIS — E11.59 HYPERTENSION ASSOCIATED WITH DIABETES (HCC): ICD-10-CM

## 2018-03-28 DIAGNOSIS — E10.8 TYPE 1 DIABETES MELLITUS WITH COMPLICATION (HCC): Primary | ICD-10-CM

## 2018-03-28 DIAGNOSIS — E78.2 MIXED DIABETIC HYPERLIPIDEMIA ASSOCIATED WITH TYPE 1 DIABETES MELLITUS (HCC): ICD-10-CM

## 2018-03-28 DIAGNOSIS — I15.2 HYPERTENSION ASSOCIATED WITH DIABETES (HCC): ICD-10-CM

## 2018-03-28 DIAGNOSIS — E55.9 VITAMIN D DEFICIENCY: ICD-10-CM

## 2018-03-28 DIAGNOSIS — E04.2 NONTOXIC MULTINODULAR GOITER: ICD-10-CM

## 2018-03-28 LAB — GLUCOSE BLDC GLUCOMTR-MCNC: 270 MG/DL (ref 70–130)

## 2018-03-28 PROCEDURE — 95250 CONT GLUC MNTR PHYS/QHP EQP: CPT | Performed by: INTERNAL MEDICINE

## 2018-03-28 PROCEDURE — 99214 OFFICE O/P EST MOD 30 MIN: CPT | Performed by: INTERNAL MEDICINE

## 2018-03-28 RX ORDER — FLASH GLUCOSE SENSOR
1 KIT MISCELLANEOUS AS NEEDED
Qty: 3 EACH | Refills: 11 | Status: SHIPPED | OUTPATIENT
Start: 2018-03-28 | End: 2019-05-17

## 2018-03-28 NOTE — PROGRESS NOTES
Subjective    Jazmine Rodriguez is a 47 y.o. female. she is here today for follow-up.    Diabetes   Pertinent negatives for hypoglycemia include no confusion, dizziness, headaches, nervousness/anxiousness, pallor, seizures, speech difficulty or tremors. Pertinent negatives for diabetes include no chest pain, no fatigue, no polydipsia, no polyphagia, no polyuria and no weakness.           History of Present Illness      followup for thyroid nodule and  diabetes type 1     In regards to thyroid nodule      Duration - diagnosed 40s  Timing - constant  Quality -   controlled  Severity - moderate     Previous Imaging -  last US from Nov 2016 right sup 1.1. cm thyroid nodule , stable when compared to 2013     TSH - Normal      She refers Hashimoto's , TPO ab neg . She is euthyroid.      ---     Type 1 DM     Duration since 10 y   Timing constant  Quality uncontrolled  Severity high - diabetic retinopathy and ckd  Alleviating Factors - Insulin, through insulin pump   Associated symptoms - fatigue,     BG readings, pump download - variable bg readings    Joanie downloaded average 165         now exercising, had vitreous hemorrhage related in part to aspirin             The following portions of the patient's history were reviewed and updated as appropriate:   Past Medical History:   Diagnosis Date   • Acute otitis media with effusion    • Acute renal failure syndrome    • Acute sinusitis    • Acute upper respiratory infection, unspecified    • Allergic rhinitis    • Astigmatism    • Borderline glaucoma    • 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    • Diabetic retinopathy     prob PDR OD   • Dyslipidemia    • Essential hypertension    • GERD (gastroesophageal reflux disease)    • Non-toxic multinodular goiter    • Nonproliferative diabetic retinopathy     possible occult PDR OD      • Puncture wound without foreign body, right lower leg, initial encounter    • Referred otalgia    • Sinus headache    •  Type 1 diabetes mellitus    • Type 2 diabetes mellitus    • Upper respiratory infection    • Vitreous hemorrhage     s/p vitrectomy OD, doing well        Past Surgical History:   Procedure Laterality Date   • BLADDER SURGERY Bilateral 1973    Bilateral refulx, with progressive deterioration of upper tracts.   •  SECTION  2009    Emergent primary low transverse  section. Intrauterine pregnancy. Chronic hypertension. Superimposed preecalmpsia. Insulin dependent diabetes.   • CYSTOSCOPY  1972    And internal urethrotomy. Bilateral reflux with Walker's membrane.   • INJECTION OF MEDICATION  2016    Kenalog (2)     • INJECTION OF MEDICATION  10/23/2015    Toradol (1)    • PAP SMEAR  2009    Negative     Family History   Problem Relation Age of Onset   • Cancer Paternal Grandfather      Colorectal Cancer   • Diabetes Other    • Hypertension Other    • Thyroid disease Other    • Breast cancer Mother    • Breast cancer Paternal Aunt      OB History      Para Term  AB Living    1 1 1       SAB TAB Ectopic Molar Multiple Live Births                 Current Outpatient Prescriptions   Medication Sig Dispense Refill   • atorvastatin (LIPITOR) 40 MG tablet TAKE ONE TABLET BY MOUTH DAILY 90 tablet 2   • fluconazole (DIFLUCAN) 150 MG tablet Take one pill today and repeat in 3 days 2 tablet 2   • HUMULIN R 500 UNIT/ML CONCENTRATED injection INJECT 1 TO 1.3 ML VIA PUMP ONCE DAILY 200 mL 4   • insulin regular (HUMULIN R) 500 UNIT/ML CONCENTRATED injection Inject  under the skin 3 (Three) Times a Day Before Meals.     • insulin regular (HumuLIN R) 500 UNIT/ML patient supplied pump Inject 500 Units under the skin Continuous. Per Insulin Pump     • levonorgestrel (MIRENA) 20 MCG/24HR IUD 1 each by Intrauterine route 1 (One) Time.     • losartan (COZAAR) 100 MG tablet Take 1 tablet by mouth Daily. 90 tablet 1   • metoprolol succinate XL (TOPROL-XL) 25 MG 24 hr tablet TAKE ONE  TABLET BY MOUTH DAILY 90 tablet 2     No current facility-administered medications for this visit.      Allergies   Allergen Reactions   • Sulfa Antibiotics Shortness Of Breath     Social History     Social History   • Marital status:      Social History Main Topics   • Smoking status: Never Smoker   • Smokeless tobacco: Never Used   • Drug use: Unknown     Other Topics Concern   • Not on file       Review of Systems  Review of Systems   Constitutional: Negative for activity change, appetite change, diaphoresis and fatigue.   HENT: Negative for congestion, dental problem, drooling, facial swelling, sneezing, sore throat, tinnitus, trouble swallowing and voice change.    Eyes: Negative for photophobia, pain, discharge, redness, itching and visual disturbance.   Respiratory: Negative for apnea, cough, choking, chest tightness and shortness of breath.    Cardiovascular: Negative for chest pain, palpitations and leg swelling.   Gastrointestinal: Negative for abdominal distention, abdominal pain, constipation, diarrhea, nausea and vomiting.   Endocrine: Negative for cold intolerance, heat intolerance, polydipsia, polyphagia and polyuria.   Genitourinary: Negative for difficulty urinating, dysuria, frequency, hematuria and urgency.   Musculoskeletal: Negative for arthralgias, back pain, gait problem, joint swelling, myalgias, neck pain and neck stiffness.   Skin: Negative for color change, pallor, rash and wound.   Allergic/Immunologic: Negative for environmental allergies, food allergies and immunocompromised state.   Neurological: Negative for dizziness, tremors, seizures, facial asymmetry, speech difficulty, weakness, light-headedness, numbness and headaches.   Hematological: Negative for adenopathy. Does not bruise/bleed easily.   Psychiatric/Behavioral: Negative for agitation, behavioral problems, confusion and sleep disturbance. The patient is not nervous/anxious.         Objective    /80 (BP Location:  Left arm, Patient Position: Sitting, Cuff Size: Large Adult)   Pulse 82   Wt 114 kg (251 lb 3.2 oz)   SpO2 98%   BMI 41.80 kg/m²   Physical Exam   Constitutional: She is oriented to person, place, and time. She appears well-developed and well-nourished. No distress.   HENT:   Head: Normocephalic and atraumatic.   Right Ear: External ear normal.   Left Ear: External ear normal.   Nose: Nose normal.   Eyes: Conjunctivae and EOM are normal. Pupils are equal, round, and reactive to light.   Neck: Normal range of motion. Neck supple. No tracheal deviation present. No thyromegaly present.   Cardiovascular: Normal rate, regular rhythm and normal heart sounds.    No murmur heard.  Pulmonary/Chest: Effort normal and breath sounds normal. No respiratory distress. She has no wheezes.   Abdominal: Soft. Bowel sounds are normal. There is no tenderness. There is no rebound and no guarding.   Musculoskeletal: Normal range of motion. She exhibits no edema, tenderness or deformity.   Neurological: She is alert and oriented to person, place, and time. No cranial nerve deficit.   Skin: Skin is warm and dry. No rash noted.   Psychiatric: She has a normal mood and affect. Her behavior is normal. Judgment and thought content normal.       Lab Review  Glucose   Date Value   03/21/2018 264 mg/dL (H)   09/11/2017 109 mg/dL (H)   10/28/2016 146 mg/dl (H)   06/20/2016 170 mg/dl (H)   03/17/2016 252 mg/dl (H)     Sodium (mmol/L)   Date Value   03/21/2018 140   09/11/2017 138   10/28/2016 140   06/20/2016 139   03/17/2016 137     Potassium (mmol/L)   Date Value   03/21/2018 4.6   09/11/2017 4.2   10/28/2016 4.2   06/20/2016 4.3   03/17/2016 4.3     Chloride (mmol/L)   Date Value   03/21/2018 99   09/11/2017 101   10/28/2016 100   06/20/2016 100   03/17/2016 99     CO2 (mmol/L)   Date Value   03/21/2018 25.0   09/11/2017 27.0   10/28/2016 33 (H)   06/20/2016 29   03/17/2016 28     BUN   Date Value   03/21/2018 22 mg/dL (H)   09/11/2017 18  mg/dL   10/28/2016 19 mg/dl   06/20/2016 13 mg/dl   03/17/2016 16 mg/dl     Creatinine   Date Value   03/21/2018 1.09 mg/dL (H)   09/11/2017 1.17 mg/dL (H)   10/28/2016 1.1 mg/dl (H)   06/20/2016 1.1 mg/dl (H)   03/17/2016 1.0 mg/dl     Hemoglobin A1C   Date Value   03/21/2018 9.4 % (H)   09/11/2017 8.6 % (H)   10/28/2016 9.0 %TotHgb (H)   06/20/2016 9.1 %TotHgb (H)   03/17/2016 9.7 %TotHgb (H)     Triglycerides   Date Value   03/21/2018 143 mg/dL   09/11/2017 96 mg/dL   10/28/2016 88 mg/dl   06/20/2016 129 mg/dl   03/17/2016 82 mg/dl     LDL Cholesterol    Date Value   03/21/2018 87 mg/dL   09/11/2017 85 mg/dL   10/28/2016 109 mg/dl   06/20/2016 90 mg/dl   03/17/2016 106 mg/dl       Assessment/Plan      1. Type 1 diabetes mellitus with complication    2. Hypertension associated with diabetes    3. Mixed diabetic hyperlipidemia associated with type 1 diabetes mellitus    4. Nontoxic multinodular goiter    5. Vitamin D deficiency    .    Medications prescribed:  Outpatient Encounter Prescriptions as of 3/28/2018   Medication Sig Dispense Refill   • atorvastatin (LIPITOR) 40 MG tablet TAKE ONE TABLET BY MOUTH DAILY 90 tablet 2   • fluconazole (DIFLUCAN) 150 MG tablet Take one pill today and repeat in 3 days 2 tablet 2   • HUMULIN R 500 UNIT/ML CONCENTRATED injection INJECT 1 TO 1.3 ML VIA PUMP ONCE DAILY 200 mL 4   • insulin regular (HUMULIN R) 500 UNIT/ML CONCENTRATED injection Inject  under the skin 3 (Three) Times a Day Before Meals.     • insulin regular (HumuLIN R) 500 UNIT/ML patient supplied pump Inject 500 Units under the skin Continuous. Per Insulin Pump     • levonorgestrel (MIRENA) 20 MCG/24HR IUD 1 each by Intrauterine route 1 (One) Time.     • losartan (COZAAR) 100 MG tablet Take 1 tablet by mouth Daily. 90 tablet 1   • metoprolol succinate XL (TOPROL-XL) 25 MG 24 hr tablet TAKE ONE TABLET BY MOUTH DAILY 90 tablet 2     No facility-administered encounter medications on file as of 3/28/2018.        Orders  placed during this encounter include:  Orders Placed This Encounter   Procedures   • POC Glucose       Glycemic management             Lab Results   Component Value Date    HGBA1C 9.4 (H) 03/21/2018    HGBA1C 8.6 (H) 09/11/2017    HGBA1C 9.0 (H) 10/28/2016     Lab Results   Component Value Date    MICROALBUR <0.6 03/21/2018    CREATININE 1.09 (H) 03/21/2018         pump with  U 500 insulin      Basal insulin      MN 0.55 - decrease to 0.525 - 0.5- 0.45---decrease to 0.40-0.425  2:30 a.m. 0.65 decrease to 0.625- 0.6 - 0.575- 0.5- decrease to 0.45-*0.475  7 a.m. 0.5 - 0.475-0.5-0.525        Sensitivity 90 - 70      carb ratio  u500 at 20 --- change to 18 - 16 - 15 - 12 - 10 -9     now do 16 up to 5 p.m. - keep -14 - 13 - 13 -11-9  at 5 p.m. 19 -- - change to 16 -14 - 13 - 11 -9     sens 90 -75- 70      she has ARLENE and CPAP may help to decrease overnight BG rise     symlin not effective, she stopped  victoza resulted in profound hypoglycemia      start farxiga 5 mg daily - not covered- on invokana - not taking           main problem is post meals rise , we will try afrezza, there is lipohypertrophy      Exercising and little carbs     So small bolus   Lipid Management     Lab Results   Component Value Date    CHOL 155 03/21/2018    CHOL 152 09/11/2017    CHLPL 178 10/28/2016    CHLPL 147 06/20/2016    CHLPL 159 03/17/2016     Lab Results   Component Value Date    TRIG 143 03/21/2018    TRIG 96 09/11/2017    TRIG 88 10/28/2016     Lab Results   Component Value Date    HDL 35 (L) 03/21/2018    HDL 34 (L) 09/11/2017    HDL 51 (L) 10/28/2016     Lab Results   Component Value Date    LDL 87 03/21/2018    LDL 85 09/11/2017     10/28/2016                Lipitor 40 mg tablet, 1 tablet(s) by mouth daily taking      continue     Lipids at goal     Blood Pressure Management:                   cozaar 100 mg daily      metoprolol succinate ER 25 mg tablet,extended release 24 hr, 1 tab daily taking   --     no hctz since  Acute on chronic kidney disease     Microvascular Complication Monitoring:  No Microalbuminuria,  nephropathy ckd stage III     no neuropathy     ==     had vitreous hemorrhage, stop aspirin   Immunizations:  Last influenza immunization 2015, Last pneumococcal immunization has had pneumovax before age 65  Preventive Care:  Patient is not smoking  Weight Related:  Obesity, Counseled on nutrition, Counseled on physical activity  Sleep apnea positive     she will have cpap titration     should use compression stockings     --  start contrave - not effective     exercising and limiting carbs but still not able to lose weight     start saxenda - not covered                      [facility]      Lab Results   Component Value Date    RJPR51SY 50.1 03/21/2018    [id number] 70.8 09/11/2017    [id number] 49.9 04/29/2015       Other Diabetes Related Aspects  Hashimoto's - no since tpo neg  MNG with subcm nodules     US personally reviewed , stable from May 2013 to Nov 2016     Lab Results   Component Value Date    TSH 1.880 03/21/2018     Lab Results   Component Value Date    [id number] 408 03/21/2018          5-14 no celiac disease          4. Follow-up: No Follow-up on file.

## 2018-03-28 NOTE — PROGRESS NOTES
Jazmine Rodriguez is a 47 y.o. female seen by diabetes educator 03/28/2018  for training on [employer] Joanie flash glucose monitoring system.     Reviewed components of Joanie system. Reviewed how to set up .  set up in office today.     Instructed patient on how to view trend graph.     Instructed patient on how to properly insert sensor including: skin prep, site selection, site rotation, sensor placement, and how to use insertion device. Patient inserted sensor in office today.     Instructed patient to change Joanie sensor every 10 days.     Instructed patient to not wear sensor or transmitter in xray, MRI, or CT Scan.    Patient advised to check blood glucose with fingerstick if symptoms do not match Joanie readings.     Advised patient to call [employer] Customer Support if has a failed sensor or if has technical questions regarding sensor.     Patient will follow up with me in 2 weeks to go over Joanie results.     Aissatou Young MS, RD, LD, CDE

## 2018-04-17 ENCOUNTER — TREATMENT (OUTPATIENT)
Dept: ENDOCRINOLOGY | Facility: CLINIC | Age: 48
End: 2018-04-17

## 2018-04-17 ENCOUNTER — OFFICE VISIT (OUTPATIENT)
Dept: ENDOCRINOLOGY | Facility: CLINIC | Age: 48
End: 2018-04-17

## 2018-04-17 DIAGNOSIS — E10.8 TYPE 1 DIABETES MELLITUS WITH COMPLICATION (HCC): Primary | ICD-10-CM

## 2018-04-17 PROCEDURE — 95251 CONT GLUC MNTR ANALYSIS I&R: CPT | Performed by: INTERNAL MEDICINE

## 2018-04-17 PROCEDURE — PTNOCHG PR CUSTOM PT NO CHARGE VISIT: Performed by: INTERNAL MEDICINE

## 2018-04-17 NOTE — PROGRESS NOTES
Jazmine Rodriguez is a 47 y.o. female seen by diabetes educator 04/17/2018 for Joanie and pump download. Patient is wearing Joanie for personal use. Average sugar was 175 mg/dl and estimated A1c was 7.7%. Patient is having hypoglycemia overnight. Changes to settings are as follows:    Basal  MN 0.425--0.4  0230 0.475--0.45  0700 0.525    Recommended changing carb ratio at breakfast from 9 to 8, but patient declined. Patient states she has been eating high-carb foods for breakfast and not bolusing until afterwards. Patient wants to try eating lower-carb breakfast foods and bolusing before meal.     Aissatou Young MS, RD, LD, CDE

## 2018-04-20 PROBLEM — E10.8 TYPE 1 DIABETES MELLITUS WITH COMPLICATION (HCC): Status: ACTIVE | Noted: 2018-04-20

## 2018-04-20 NOTE — PROGRESS NOTES
Patient is wearing Joanie for personal use. Average sugar was 175 mg/dl and estimated A1c was 7.7%. Patient is having hypoglycemia overnight. Changes to settings are as follows:     Basal  MN 0.425--0.4  0230 0.475--0.45  0700 0.525     Recommended changing carb ratio at breakfast from 9 to 8, but patient declined. Patient states she has been eating high-carb foods for breakfast and not bolusing until afterwards. Patient wants to try eating lower-carb breakfast foods and bolusing before meal.

## 2018-06-18 RX ORDER — LOSARTAN POTASSIUM 100 MG/1
100 TABLET ORAL DAILY
Qty: 90 TABLET | Refills: 1 | Status: SHIPPED | OUTPATIENT
Start: 2018-06-18 | End: 2018-11-30 | Stop reason: SDUPTHER

## 2018-09-13 RX ORDER — INSULIN HUMAN 500 [IU]/ML
INJECTION, SOLUTION SUBCUTANEOUS
Qty: 5 VIAL | Refills: 3 | Status: SHIPPED | OUTPATIENT
Start: 2018-09-13 | End: 2019-11-18 | Stop reason: SDUPTHER

## 2018-10-30 RX ORDER — ATORVASTATIN CALCIUM 40 MG/1
TABLET, FILM COATED ORAL
Qty: 90 TABLET | Refills: 1 | Status: SHIPPED | OUTPATIENT
Start: 2018-10-30 | End: 2019-04-06 | Stop reason: SDUPTHER

## 2018-10-30 RX ORDER — METOPROLOL SUCCINATE 25 MG/1
TABLET, EXTENDED RELEASE ORAL
Qty: 90 TABLET | Refills: 1 | Status: SHIPPED | OUTPATIENT
Start: 2018-10-30 | End: 2019-04-06 | Stop reason: SDUPTHER

## 2018-12-03 RX ORDER — LOSARTAN POTASSIUM 100 MG/1
100 TABLET ORAL DAILY
Qty: 90 TABLET | Refills: 1 | Status: SHIPPED | OUTPATIENT
Start: 2018-12-03 | End: 2019-05-08 | Stop reason: SDUPTHER

## 2019-01-08 ENCOUNTER — APPOINTMENT (OUTPATIENT)
Dept: LAB | Facility: HOSPITAL | Age: 49
End: 2019-01-08

## 2019-01-08 LAB
25(OH)D3 SERPL-MCNC: 58.7 NG/ML (ref 30–100)
ALBUMIN SERPL-MCNC: 4.1 G/DL (ref 3.4–4.8)
ALBUMIN UR-MCNC: 1.3 MG/L
ALBUMIN/GLOB SERPL: 1.5 G/DL (ref 1.1–1.8)
ALP SERPL-CCNC: 100 U/L (ref 38–126)
ALT SERPL W P-5'-P-CCNC: 27 U/L (ref 9–52)
ANION GAP SERPL CALCULATED.3IONS-SCNC: 11 MMOL/L (ref 5–15)
ARTICHOKE IGE QN: 107 MG/DL (ref 1–129)
AST SERPL-CCNC: 24 U/L (ref 14–36)
BASOPHILS # BLD AUTO: 0.05 10*3/MM3 (ref 0–0.2)
BASOPHILS NFR BLD AUTO: 0.6 % (ref 0–2)
BILIRUB SERPL-MCNC: 0.9 MG/DL (ref 0.2–1.3)
BUN BLD-MCNC: 21 MG/DL (ref 7–21)
BUN/CREAT SERPL: 16.4 (ref 7–25)
CALCIUM SPEC-SCNC: 9.5 MG/DL (ref 8.4–10.2)
CHLORIDE SERPL-SCNC: 100 MMOL/L (ref 95–110)
CHOLEST SERPL-MCNC: 167 MG/DL (ref 0–199)
CO2 SERPL-SCNC: 24 MMOL/L (ref 22–31)
CREAT BLD-MCNC: 1.28 MG/DL (ref 0.5–1)
CREAT UR-MCNC: 186.1 MG/DL
DEPRECATED RDW RBC AUTO: 38.8 FL (ref 36.4–46.3)
EOSINOPHIL # BLD AUTO: 0.37 10*3/MM3 (ref 0–0.7)
EOSINOPHIL NFR BLD AUTO: 4.5 % (ref 0–7)
ERYTHROCYTE [DISTWIDTH] IN BLOOD BY AUTOMATED COUNT: 12.5 % (ref 11.5–14.5)
GFR SERPL CREATININE-BSD FRML MDRD: 45 ML/MIN/1.73 (ref 58–135)
GLOBULIN UR ELPH-MCNC: 2.7 GM/DL (ref 2.3–3.5)
GLUCOSE BLD-MCNC: 254 MG/DL (ref 60–100)
HBA1C MFR BLD: 9.6 % (ref 4–5.6)
HCT VFR BLD AUTO: 38 % (ref 35–45)
HDLC SERPL-MCNC: 35 MG/DL (ref 60–200)
HGB BLD-MCNC: 13.1 G/DL (ref 12–15.5)
IMM GRANULOCYTES # BLD AUTO: 0.01 10*3/MM3 (ref 0–0.02)
IMM GRANULOCYTES NFR BLD AUTO: 0.1 % (ref 0–0.5)
LDLC/HDLC SERPL: 2.99 {RATIO} (ref 0–3.22)
LYMPHOCYTES # BLD AUTO: 2.6 10*3/MM3 (ref 0.6–4.2)
LYMPHOCYTES NFR BLD AUTO: 31.3 % (ref 10–50)
MCH RBC QN AUTO: 29.4 PG (ref 26.5–34)
MCHC RBC AUTO-ENTMCNC: 34.5 G/DL (ref 31.4–36)
MCV RBC AUTO: 85.2 FL (ref 80–98)
MICROALBUMIN/CREAT UR: 7 MG/G (ref 0–30)
MONOCYTES # BLD AUTO: 0.7 10*3/MM3 (ref 0–0.9)
MONOCYTES NFR BLD AUTO: 8.4 % (ref 0–12)
NEUTROPHILS # BLD AUTO: 4.58 10*3/MM3 (ref 2–8.6)
NEUTROPHILS NFR BLD AUTO: 55.1 % (ref 37–80)
PLATELET # BLD AUTO: 237 10*3/MM3 (ref 150–450)
PMV BLD AUTO: 10.2 FL (ref 8–12)
POTASSIUM BLD-SCNC: 4.5 MMOL/L (ref 3.5–5.1)
PROT SERPL-MCNC: 6.8 G/DL (ref 6.3–8.6)
RBC # BLD AUTO: 4.46 10*6/MM3 (ref 3.77–5.16)
SODIUM BLD-SCNC: 135 MMOL/L (ref 137–145)
TRIGL SERPL-MCNC: 136 MG/DL (ref 20–199)
VIT B12 BLD-MCNC: 428 PG/ML (ref 239–931)
WBC NRBC COR # BLD: 8.31 10*3/MM3 (ref 3.2–9.8)

## 2019-01-08 PROCEDURE — 82784 ASSAY IGA/IGD/IGG/IGM EACH: CPT | Performed by: INTERNAL MEDICINE

## 2019-01-08 PROCEDURE — 82607 VITAMIN B-12: CPT | Performed by: INTERNAL MEDICINE

## 2019-01-08 PROCEDURE — 82570 ASSAY OF URINE CREATININE: CPT | Performed by: INTERNAL MEDICINE

## 2019-01-08 PROCEDURE — 85025 COMPLETE CBC W/AUTO DIFF WBC: CPT | Performed by: INTERNAL MEDICINE

## 2019-01-08 PROCEDURE — 36415 COLL VENOUS BLD VENIPUNCTURE: CPT | Performed by: INTERNAL MEDICINE

## 2019-01-08 PROCEDURE — 80053 COMPREHEN METABOLIC PANEL: CPT | Performed by: INTERNAL MEDICINE

## 2019-01-08 PROCEDURE — 82043 UR ALBUMIN QUANTITATIVE: CPT | Performed by: INTERNAL MEDICINE

## 2019-01-08 PROCEDURE — 80061 LIPID PANEL: CPT | Performed by: INTERNAL MEDICINE

## 2019-01-08 PROCEDURE — 82306 VITAMIN D 25 HYDROXY: CPT | Performed by: INTERNAL MEDICINE

## 2019-01-08 PROCEDURE — 83036 HEMOGLOBIN GLYCOSYLATED A1C: CPT | Performed by: INTERNAL MEDICINE

## 2019-01-08 PROCEDURE — 83516 IMMUNOASSAY NONANTIBODY: CPT | Performed by: INTERNAL MEDICINE

## 2019-01-09 ENCOUNTER — OFFICE VISIT (OUTPATIENT)
Dept: ENDOCRINOLOGY | Facility: CLINIC | Age: 49
End: 2019-01-09

## 2019-01-09 VITALS
DIASTOLIC BLOOD PRESSURE: 78 MMHG | OXYGEN SATURATION: 99 % | HEIGHT: 65 IN | HEART RATE: 82 BPM | BODY MASS INDEX: 42.27 KG/M2 | SYSTOLIC BLOOD PRESSURE: 130 MMHG | WEIGHT: 253.7 LBS

## 2019-01-09 DIAGNOSIS — E55.9 VITAMIN D DEFICIENCY: ICD-10-CM

## 2019-01-09 DIAGNOSIS — I15.2 HYPERTENSION ASSOCIATED WITH DIABETES (HCC): ICD-10-CM

## 2019-01-09 DIAGNOSIS — E10.8 TYPE 1 DIABETES MELLITUS WITH COMPLICATION (HCC): Primary | ICD-10-CM

## 2019-01-09 DIAGNOSIS — E04.2 NONTOXIC MULTINODULAR GOITER: ICD-10-CM

## 2019-01-09 DIAGNOSIS — N18.30 STAGE 3 CHRONIC KIDNEY DISEASE (HCC): ICD-10-CM

## 2019-01-09 DIAGNOSIS — E78.2 MIXED DIABETIC HYPERLIPIDEMIA ASSOCIATED WITH TYPE 1 DIABETES MELLITUS (HCC): ICD-10-CM

## 2019-01-09 DIAGNOSIS — E11.59 HYPERTENSION ASSOCIATED WITH DIABETES (HCC): ICD-10-CM

## 2019-01-09 DIAGNOSIS — E53.8 B12 DEFICIENCY: ICD-10-CM

## 2019-01-09 DIAGNOSIS — E10.69 MIXED DIABETIC HYPERLIPIDEMIA ASSOCIATED WITH TYPE 1 DIABETES MELLITUS (HCC): ICD-10-CM

## 2019-01-09 LAB
GLIADIN PEPTIDE IGA SER-ACNC: 5 UNITS (ref 0–19)
IGA SERPL-MCNC: 204 MG/DL (ref 87–352)
TTG IGA SER-ACNC: <2 U/ML (ref 0–3)

## 2019-01-09 PROCEDURE — 99214 OFFICE O/P EST MOD 30 MIN: CPT | Performed by: INTERNAL MEDICINE

## 2019-01-09 NOTE — PROGRESS NOTES
Subjective    Jazmine Rodriguez is a 48 y.o. female. she is here today for follow-up.    Diabetes   Pertinent negatives for hypoglycemia include no confusion, dizziness, headaches, nervousness/anxiousness, pallor, seizures, speech difficulty or tremors. Pertinent negatives for diabetes include no chest pain, no fatigue, no polydipsia, no polyphagia, no polyuria and no weakness.           History of Present Illness      followup for thyroid nodule and  diabetes type 1     In regards to thyroid nodule      Duration - diagnosed 40s  Timing - constant  Quality -   controlled  Severity - moderate     Previous Imaging -  last US from Nov 2016 right sup 1.1. cm thyroid nodule , stable when compared to 2013     TSH - Normal      She refers Hashimoto's , TPO ab neg . She is euthyroid.      ---     Type 1 DM     Duration since 10 y   Timing constant  Quality uncontrolled  Severity high - diabetic retinopathy and ckd  Alleviating Factors - Insulin, through insulin pump   Associated symptoms - fatigue,     BG readings, pump download - variable bg readings         now exercising, had vitreous hemorrhage related in part to aspirin             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 (CMS/HCC)    • Diabetic retinopathy (CMS/HCC)     prob PDR OD   • Dyslipidemia    • Essential hypertension    • GERD (gastroesophageal reflux disease)    • Non-toxic multinodular goiter    • Nonproliferative diabetic retinopathy (CMS/HCC)     possible occult PDR OD      • Puncture wound without foreign body, right lower leg, initial encounter    • Referred otalgia    • Sinus  headache    • Type 1 diabetes mellitus (CMS/HCC)    • Type 2 diabetes mellitus (CMS/HCC)    • Upper respiratory infection    • Vitreous hemorrhage (CMS/HCC)     s/p vitrectomy OD, doing well        Past Surgical History:   Procedure Laterality Date   • BLADDER SURGERY Bilateral 1973    Bilateral refulx, with progressive deterioration of upper tracts.   •  SECTION  2009    Emergent primary low transverse  section. Intrauterine pregnancy. Chronic hypertension. Superimposed preecalmpsia. Insulin dependent diabetes.   • CYSTOSCOPY  1972    And internal urethrotomy. Bilateral reflux with Walker's membrane.   • INJECTION OF MEDICATION  2016    Kenalog (2)     • INJECTION OF MEDICATION  10/23/2015    Toradol (1)    • PAP SMEAR  2009    Negative     Family History   Problem Relation Age of Onset   • Cancer Paternal Grandfather         Colorectal Cancer   • Diabetes Other    • Hypertension Other    • Thyroid disease Other    • Breast cancer Mother    • Breast cancer Paternal Aunt      OB History      Para Term  AB Living    1 1 1          SAB TAB Ectopic Molar Multiple Live Births                       Current Outpatient Medications   Medication Sig Dispense Refill   • atorvastatin (LIPITOR) 40 MG tablet TAKE 1 TABLET BY MOUTH  DAILY 90 tablet 1   • Continuous Blood Gluc Sensor (TrelligenceYLE YIFAN SENSOR SYSTEM) misc Inject 1 each under the skin As Needed (every 10 days). NDC [address]-65,[facility]: 36273913,CARDINAL: 4043234,[facility]: [id number] 3 each 11   • HUMULIN R 500 UNIT/ML CONCENTRATED injection INJECT 1 TO 1.3 MILLILITERS VIA PUMP DAILY 5 vial 3   • levonorgestrel (MIRENA) 20 MCG/24HR IUD 1 each by Intrauterine route 1 (One) Time.     • losartan (COZAAR) 100 MG tablet TAKE 1 TABLET BY MOUTH  DAILY 90 tablet 1   • metoprolol succinate XL (TOPROL-XL) 25 MG 24 hr tablet TAKE 1 TABLET BY MOUTH  DAILY 90 tablet 1   • ONE TOUCH ULTRA TEST test strip TEST 4 TIMES DAILY 400  each 3     No current facility-administered medications for this visit.      Allergies   Allergen Reactions   • Sulfa Antibiotics Shortness Of Breath     Social History     Socioeconomic History   • Marital status:      Spouse name: Not on file   • Number of children: Not on file   • Years of education: Not on file   • Highest education level: Not on file   Tobacco Use   • Smoking status: Never Smoker   • Smokeless tobacco: Never Used       Review of Systems  Review of Systems   Constitutional: Negative for activity change, appetite change, diaphoresis and fatigue.   HENT: Negative for congestion, dental problem, drooling, facial swelling, sneezing, sore throat, tinnitus, trouble swallowing and voice change.    Eyes: Negative for photophobia, pain, discharge, redness, itching and visual disturbance.   Respiratory: Negative for apnea, cough, choking, chest tightness and shortness of breath.    Cardiovascular: Negative for chest pain, palpitations and leg swelling.   Gastrointestinal: Negative for abdominal distention, abdominal pain, constipation, diarrhea, nausea and vomiting.   Endocrine: Negative for cold intolerance, heat intolerance, polydipsia, polyphagia and polyuria.   Genitourinary: Negative for difficulty urinating, dysuria, frequency, hematuria and urgency.   Musculoskeletal: Negative for arthralgias, back pain, gait problem, joint swelling, myalgias, neck pain and neck stiffness.   Skin: Negative for color change, pallor, rash and wound.   Allergic/Immunologic: Negative for environmental allergies, food allergies and immunocompromised state.   Neurological: Negative for dizziness, tremors, seizures, facial asymmetry, speech difficulty, weakness, light-headedness, numbness and headaches.   Hematological: Negative for adenopathy. Does not bruise/bleed easily.   Psychiatric/Behavioral: Negative for agitation, behavioral problems, confusion and sleep disturbance. The patient is not nervous/anxious.   "       Objective    /78   Pulse 82   Ht 165.1 cm (65\")   Wt 115 kg (253 lb 11.2 oz)   SpO2 99%   BMI 42.22 kg/m²   Physical Exam   Constitutional: She is oriented to person, place, and time. She appears well-developed and well-nourished. No distress.   HENT:   Head: Normocephalic and atraumatic.   Right Ear: External ear normal.   Left Ear: External ear normal.   Nose: Nose normal.   Eyes: Conjunctivae and EOM are normal. Pupils are equal, round, and reactive to light.   Neck: Normal range of motion. Neck supple. No tracheal deviation present. No thyromegaly present.   Cardiovascular: Normal rate, regular rhythm and normal heart sounds.   No murmur heard.  Pulmonary/Chest: Effort normal and breath sounds normal. No respiratory distress. She has no wheezes.   Abdominal: Soft. Bowel sounds are normal. There is no tenderness. There is no rebound and no guarding.   Musculoskeletal: Normal range of motion. She exhibits no edema, tenderness or deformity.   Neurological: She is alert and oriented to person, place, and time. No cranial nerve deficit.   Skin: Skin is warm and dry. No rash noted.   Psychiatric: She has a normal mood and affect. Her behavior is normal. Judgment and thought content normal.       Lab Review  Glucose (mg/dL)   Date Value   01/08/2019 254 (H)   03/21/2018 264 (H)   09/11/2017 109 (H)     Sodium (mmol/L)   Date Value   01/08/2019 135 (L)   03/21/2018 140   09/11/2017 138     Potassium (mmol/L)   Date Value   01/08/2019 4.5   03/21/2018 4.6   09/11/2017 4.2     Chloride (mmol/L)   Date Value   01/08/2019 100   03/21/2018 99   09/11/2017 101     CO2 (mmol/L)   Date Value   01/08/2019 24.0   03/21/2018 25.0   09/11/2017 27.0     BUN (mg/dL)   Date Value   01/08/2019 21   03/21/2018 22 (H)   09/11/2017 18     Creatinine (mg/dL)   Date Value   01/08/2019 1.28 (H)   03/21/2018 1.09 (H)   09/11/2017 1.17 (H)     Hemoglobin A1C (%)   Date Value   01/08/2019 9.6 (H)   03/21/2018 9.4 (H) "   09/11/2017 8.6 (H)     Triglycerides (mg/dL)   Date Value   01/08/2019 136   03/21/2018 143   09/11/2017 96     LDL Cholesterol  (mg/dL)   Date Value   01/08/2019 107   03/21/2018 87   09/11/2017 85       Assessment/Plan      1. Type 1 diabetes mellitus with complication (CMS/[facility])    2. Hypertension associated with diabetes (CMS/[facility])    3. Mixed diabetic hyperlipidemia associated with type 1 diabetes mellitus (CMS/[facility])    4. Nontoxic multinodular goiter    5. Vitamin D deficiency    6. B12 deficiency    .    Medications prescribed:  Outpatient Encounter Medications as of 1/9/2019   Medication Sig Dispense Refill   • atorvastatin (LIPITOR) 40 MG tablet TAKE 1 TABLET BY MOUTH  DAILY 90 tablet 1   • Continuous Blood Gluc Sensor (FREESTYLE YIFAN SENSOR SYSTEM) misc Inject 1 each under the skin As Needed (every 10 days). NDC [address]-02,ABC8: 33808296,CARDINAL: [id number],[facility]\""SON: [id number] 3 each 11   • HUMULIN R 500 UNIT/ML CONCENTRATED injection INJECT 1 TO 1.3 MILLILITERS VIA PUMP DAILY 5 vial 3   • levonorgestrel (MIRENA) 20 MCG/24HR IUD 1 each by Intrauterine route 1 (One) Time.     • losartan (COZAAR) 100 MG tablet TAKE 1 TABLET BY MOUTH  DAILY 90 tablet 1   • metoprolol succinate XL (TOPROL-XL) 25 MG 24 hr tablet TAKE 1 TABLET BY MOUTH  DAILY 90 tablet 1   • ONE TOUCH ULTRA TEST test strip TEST 4 TIMES DAILY 400 each 3     No facility-administered encounter medications on file as of 1/9/2019.        Orders placed during this encounter include:  No orders of the defined types were placed in this encounter.      Glycemic management             Lab Results   Component Value Date    HGBA1C 9.6 (H) 01/08/2019    HGBA1C 9.4 (H) 03/21/2018    HGBA1C 8.6 (H) 09/11/2017     Lab Results   Component Value Date    MICROALBUR 1.3 01/08/2019    CREATININE 1.28 (H) 01/08/2019         pump with  U 500 insulin      Basal insulin      MN 0.4  2:30 am 0.45  7 am 0.525    --- change all to 0.375    Carb ratio 9, correction 70 - turn  mracial off    Set doses 13 for bkfast and 8 for supper          she has ARLENE and CPAP may help to decrease overnight BG rise     symlin not effective, she stopped    victoza resulted in profound hypoglycemia      No sglt2 I         Lipid Management     Lab Results   Component Value Date    CHOL 167 01/08/2019    CHOL 155 03/21/2018    CHOL 152 09/11/2017    CHLPL 178 10/28/2016    CHLPL 147 06/20/2016    CHLPL 159 03/17/2016     Lab Results   Component Value Date    TRIG 136 01/08/2019    TRIG 143 03/21/2018    TRIG 96 09/11/2017     Lab Results   Component Value Date    HDL 35 (L) 01/08/2019    HDL 35 (L) 03/21/2018    HDL 34 (L) 09/11/2017     Lab Results   Component Value Date     01/08/2019    LDL 87 03/21/2018    LDL 85 09/11/2017                Lipitor 40 mg tablet, 1 tablet(s) by mouth daily taking      continue     Lipids at goal     Blood Pressure Management:                   cozaar 100 mg daily      metoprolol succinate ER 25 mg tablet,extended release 24 hr, 1 tab daily taking   --     no hctz since Acute on chronic kidney disease     Microvascular Complication Monitoring:  No Microalbuminuria,  nephropathy ckd stage III     no neuropathy     ==     had vitreous hemorrhage, stop aspirin   Immunizations:  Last influenza immunization 2015, Last pneumococcal immunization has had pneumovax before age 65  Preventive Care:  Patient is not smoking  Weight Related:  Obesity, Counseled on nutrition, Counseled on physical activity  Sleep apnea positive     she will have cpap titration     should use compression stockings     --  start contrave - not effective     exercising and limiting carbs but still not able to lose weight     start saxenda - not covered                      [facility]      Lab Results   Component Value Date    [id number] 58.7 01/08/2019    [id number] 50.1 03/21/2018    [id number] 70.8 09/11/2017       Other Diabetes Related Aspects  Hashimoto's - no since tpo neg  MNG with subcm nodules      US personally reviewed , stable from May 2013 to Nov 2016     Lab Results   Component Value Date    TSH 1.880 03/21/2018     Lab Results   Component Value Date    [id number] 428 01/08/2019          5-14 no celiac disease          4. Follow-up: No Follow-up on file.

## 2019-01-15 ENCOUNTER — TELEPHONE (OUTPATIENT)
Dept: ENDOCRINOLOGY | Facility: CLINIC | Age: 49
End: 2019-01-15

## 2019-01-16 ENCOUNTER — LAB (OUTPATIENT)
Dept: LAB | Facility: HOSPITAL | Age: 49
End: 2019-01-16

## 2019-01-16 DIAGNOSIS — N18.30 STAGE 3 CHRONIC KIDNEY DISEASE (HCC): ICD-10-CM

## 2019-01-16 LAB
ANION GAP SERPL CALCULATED.3IONS-SCNC: 8 MMOL/L (ref 5–15)
BUN BLD-MCNC: 18 MG/DL (ref 7–21)
BUN/CREAT SERPL: 14.5 (ref 7–25)
CALCIUM SPEC-SCNC: 8.9 MG/DL (ref 8.4–10.2)
CHLORIDE SERPL-SCNC: 98 MMOL/L (ref 95–110)
CO2 SERPL-SCNC: 25 MMOL/L (ref 22–31)
CREAT BLD-MCNC: 1.24 MG/DL (ref 0.5–1)
GFR SERPL CREATININE-BSD FRML MDRD: 46 ML/MIN/1.73 (ref 58–135)
GLUCOSE BLD-MCNC: 386 MG/DL (ref 60–100)
POTASSIUM BLD-SCNC: 4.9 MMOL/L (ref 3.5–5.1)
SODIUM BLD-SCNC: 131 MMOL/L (ref 137–145)

## 2019-01-16 PROCEDURE — 80048 BASIC METABOLIC PNL TOTAL CA: CPT

## 2019-01-16 PROCEDURE — 36415 COLL VENOUS BLD VENIPUNCTURE: CPT

## 2019-01-17 ENCOUNTER — OFFICE VISIT (OUTPATIENT)
Dept: ENDOCRINOLOGY | Facility: CLINIC | Age: 49
End: 2019-01-17

## 2019-01-17 VITALS
BODY MASS INDEX: 42.9 KG/M2 | HEIGHT: 65 IN | DIASTOLIC BLOOD PRESSURE: 70 MMHG | WEIGHT: 257.5 LBS | SYSTOLIC BLOOD PRESSURE: 140 MMHG

## 2019-01-17 DIAGNOSIS — E10.8 TYPE 1 DIABETES MELLITUS WITH COMPLICATION (HCC): Primary | ICD-10-CM

## 2019-01-17 DIAGNOSIS — E55.9 VITAMIN D DEFICIENCY: ICD-10-CM

## 2019-01-17 DIAGNOSIS — E78.2 MIXED DIABETIC HYPERLIPIDEMIA ASSOCIATED WITH TYPE 1 DIABETES MELLITUS (HCC): ICD-10-CM

## 2019-01-17 DIAGNOSIS — I15.2 HYPERTENSION ASSOCIATED WITH DIABETES (HCC): ICD-10-CM

## 2019-01-17 DIAGNOSIS — E11.59 HYPERTENSION ASSOCIATED WITH DIABETES (HCC): ICD-10-CM

## 2019-01-17 DIAGNOSIS — N18.30 STAGE 3 CHRONIC KIDNEY DISEASE (HCC): ICD-10-CM

## 2019-01-17 DIAGNOSIS — E10.69 MIXED DIABETIC HYPERLIPIDEMIA ASSOCIATED WITH TYPE 1 DIABETES MELLITUS (HCC): ICD-10-CM

## 2019-01-17 DIAGNOSIS — E53.8 B12 DEFICIENCY: ICD-10-CM

## 2019-01-17 DIAGNOSIS — E04.2 NONTOXIC MULTINODULAR GOITER: ICD-10-CM

## 2019-01-17 LAB — GLUCOSE BLDC GLUCOMTR-MCNC: 304 MG/DL (ref 70–130)

## 2019-01-17 PROCEDURE — 99214 OFFICE O/P EST MOD 30 MIN: CPT | Performed by: INTERNAL MEDICINE

## 2019-01-17 PROCEDURE — 95249 CONT GLUC MNTR PT PROV EQP: CPT | Performed by: INTERNAL MEDICINE

## 2019-01-17 NOTE — PROGRESS NOTES
Subjective    Jazmine Rodriguez is a 48 y.o. female. she is here today for follow-up.    Diabetes   Pertinent negatives for hypoglycemia include no confusion, dizziness, headaches, nervousness/anxiousness, pallor, seizures, speech difficulty or tremors. Pertinent negatives for diabetes include no chest pain, no fatigue, no polydipsia, no polyphagia, no polyuria and no weakness.           History of Present Illness      followup for thyroid nodule and  diabetes type 1     In regards to thyroid nodule      Duration - diagnosed 40s  Timing - constant  Quality -    controlled  Severity - moderate     Previous Imaging -  last US from Nov 2016 right sup 1.1. cm thyroid nodule , stable when compared to 2013     TSH - Normal      She refers Hashimoto's , TPO ab neg . She is euthyroid.      ---     Type 1 DM     Duration since 10 y   Timing constant  Quality uncontrolled  Severity high - diabetic retinopathy and ckd  Alleviating Factors - Insulin, through insulin pump   Associated symptoms - fatigue           Ho  vitreous hemorrhage related in part to aspirin             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 (CMS/HCC)    • Diabetic retinopathy (CMS/HCC)     prob PDR OD   • Dyslipidemia    • Essential hypertension    • GERD (gastroesophageal reflux disease)    • Non-toxic multinodular goiter    • Nonproliferative diabetic retinopathy (CMS/HCC)     possible occult PDR OD      • Puncture wound without foreign body, right lower leg, initial encounter    • Referred otalgia    • Sinus headache    • Type 1 diabetes mellitus (CMS/HCC)    • Type 2 diabetes  mellitus (CMS/HCC)    • Upper respiratory infection    • Vitreous hemorrhage (CMS/HCC)     s/p vitrectomy OD, doing well        Past Surgical History:   Procedure Laterality Date   • BLADDER SURGERY Bilateral 1973    Bilateral refulx, with progressive deterioration of upper tracts.   •  SECTION  2009    Emergent primary low transverse  section. Intrauterine pregnancy. Chronic hypertension. Superimposed preecalmpsia. Insulin dependent diabetes.   • CYSTOSCOPY  1972    And internal urethrotomy. Bilateral reflux with Walker's membrane.   • INJECTION OF MEDICATION  2016    Kenalog (2)     • INJECTION OF MEDICATION  10/23/2015    Toradol (1)    • PAP SMEAR  2009    Negative     Family History   Problem Relation Age of Onset   • Cancer Paternal Grandfather         Colorectal Cancer   • Diabetes Other    • Hypertension Other    • Thyroid disease Other    • Breast cancer Mother    • Breast cancer Paternal Aunt      OB History      Para Term  AB Living    1 1 1          SAB TAB Ectopic Molar Multiple Live Births                       Current Outpatient Medications   Medication Sig Dispense Refill   • atorvastatin (LIPITOR) 40 MG tablet TAKE 1 TABLET BY MOUTH  DAILY 90 tablet 1   • Continuous Blood Gluc Sensor ([employer] YIFAN SENSOR SYSTEM) misc Inject 1 each under the skin As Needed (every 10 days). NDC [address]-03,[facility]: [id number],CARDINAL: [id number],[facility]: [id number] 3 each 11   • HUMULIN R 500 UNIT/ML CONCENTRATED injection INJECT 1 TO 1.3 MILLILITERS VIA PUMP DAILY 5 vial 3   • levonorgestrel (MIRENA) 20 MCG/24HR IUD 1 each by Intrauterine route 1 (One) Time.     • losartan (COZAAR) 100 MG tablet TAKE 1 TABLET BY MOUTH  DAILY 90 tablet 1   • metoprolol succinate XL (TOPROL-XL) 25 MG 24 hr tablet TAKE 1 TABLET BY MOUTH  DAILY 90 tablet 1   • ONE TOUCH ULTRA TEST test strip TEST 4 TIMES DAILY 400 each 3     No current facility-administered medications for this visit.  "     Allergies   Allergen Reactions   • Sulfa Antibiotics Shortness Of Breath     Social History     Socioeconomic History   • Marital status:      Spouse name: Not on file   • Number of children: Not on file   • Years of education: Not on file   • Highest education level: Not on file   Tobacco Use   • Smoking status: Never Smoker   • Smokeless tobacco: Never Used       Review of Systems  Review of Systems   Constitutional: Negative for activity change, appetite change, diaphoresis and fatigue.   HENT: Negative for congestion, dental problem, drooling, facial swelling, sneezing, sore throat, tinnitus, trouble swallowing and voice change.    Eyes: Negative for photophobia, pain, discharge, redness, itching and visual disturbance.   Respiratory: Negative for apnea, cough, choking, chest tightness and shortness of breath.    Cardiovascular: Negative for chest pain, palpitations and leg swelling.   Gastrointestinal: Negative for abdominal distention, abdominal pain, constipation, diarrhea, nausea and vomiting.   Endocrine: Negative for cold intolerance, heat intolerance, polydipsia, polyphagia and polyuria.   Genitourinary: Negative for difficulty urinating, dysuria, frequency, hematuria and urgency.   Musculoskeletal: Negative for arthralgias, back pain, gait problem, joint swelling, myalgias, neck pain and neck stiffness.   Skin: Negative for color change, pallor, rash and wound.   Allergic/Immunologic: Negative for environmental allergies, food allergies and immunocompromised state.   Neurological: Negative for dizziness, tremors, seizures, facial asymmetry, speech difficulty, weakness, light-headedness, numbness and headaches.   Hematological: Negative for adenopathy. Does not bruise/bleed easily.   Psychiatric/Behavioral: Negative for agitation, behavioral problems, confusion and sleep disturbance. The patient is not nervous/anxious.         Objective    /70 (BP Location: Left arm)   Ht 165.1 cm (65\") "   Wt 117 kg (257 lb 8 oz)   BMI 42.85 kg/m²   Physical Exam   Constitutional: She is oriented to person, place, and time. She appears well-developed and well-nourished. No distress.   HENT:   Head: Normocephalic and atraumatic.   Right Ear: External ear normal.   Left Ear: External ear normal.   Nose: Nose normal.   Eyes: Conjunctivae and EOM are normal. Pupils are equal, round, and reactive to light.   Neck: Normal range of motion. Neck supple. No tracheal deviation present. No thyromegaly present.   Cardiovascular: Normal rate, regular rhythm and normal heart sounds.   No murmur heard.  Pulmonary/Chest: Effort normal and breath sounds normal. No respiratory distress. She has no wheezes.   Abdominal: Soft. Bowel sounds are normal. There is no tenderness. There is no rebound and no guarding.   Musculoskeletal: Normal range of motion. She exhibits no edema, tenderness or deformity.   Neurological: She is alert and oriented to person, place, and time. No cranial nerve deficit.   Skin: Skin is warm and dry. No rash noted.   Psychiatric: She has a normal mood and affect. Her behavior is normal. Judgment and thought content normal.       Lab Review  Glucose (mg/dL)   Date Value   01/16/2019 386 (H)   01/08/2019 254 (H)   03/21/2018 264 (H)     Sodium (mmol/L)   Date Value   01/16/2019 131 (L)   01/08/2019 135 (L)   03/21/2018 140     Potassium (mmol/L)   Date Value   01/16/2019 4.9   01/08/2019 4.5   03/21/2018 4.6     Chloride (mmol/L)   Date Value   01/16/2019 98   01/08/2019 100   03/21/2018 99     CO2 (mmol/L)   Date Value   01/16/2019 25.0   01/08/2019 24.0   03/21/2018 25.0     BUN (mg/dL)   Date Value   01/16/2019 18   01/08/2019 21   03/21/2018 22 (H)     Creatinine (mg/dL)   Date Value   01/16/2019 1.24 (H)   01/08/2019 1.28 (H)   03/21/2018 1.09 (H)     Hemoglobin A1C (%)   Date Value   01/08/2019 9.6 (H)   03/21/2018 9.4 (H)   09/11/2017 8.6 (H)     Triglycerides (mg/dL)   Date Value   01/08/2019 136    03/21/2018 143   09/11/2017 96     LDL Cholesterol  (mg/dL)   Date Value   01/08/2019 107   03/21/2018 87   09/11/2017 85       Assessment/Plan      1. Type 1 diabetes mellitus with complication (CMS/[facility])    2. Hypertension associated with diabetes (CMS/[facility])    3. Mixed diabetic hyperlipidemia associated with type 1 diabetes mellitus (CMS/[facility])    4. Nontoxic multinodular goiter    5. Vitamin D deficiency    6. B12 deficiency    7. Stage 3 chronic kidney disease (CMS/[facility])    .    Medications prescribed:  Outpatient Encounter Medications as of 1/17/2019   Medication Sig Dispense Refill   • atorvastatin (LIPITOR) 40 MG tablet TAKE 1 TABLET BY MOUTH  DAILY 90 tablet 1   • Continuous Blood Gluc Sensor ([employer] YIFAN SENSOR SYSTEM) misc Inject 1 each under the skin As Needed (every 10 days). NDC [address]-59,[facility]: [id number],CARDINAL: [id number],[facility]: [id number] 3 each 11   • HUMULIN R 500 UNIT/ML CONCENTRATED injection INJECT 1 TO 1.3 MILLILITERS VIA PUMP DAILY 5 vial 3   • levonorgestrel (MIRENA) 20 MCG/24HR IUD 1 each by Intrauterine route 1 (One) Time.     • losartan (COZAAR) 100 MG tablet TAKE 1 TABLET BY MOUTH  DAILY 90 tablet 1   • metoprolol succinate XL (TOPROL-XL) 25 MG 24 hr tablet TAKE 1 TABLET BY MOUTH  DAILY 90 tablet 1   • ONE TOUCH ULTRA TEST test strip TEST 4 TIMES DAILY 400 each 3     No facility-administered encounter medications on file as of 1/17/2019.        Orders placed during this encounter include:  No orders of the defined types were placed in this encounter.      Glycemic management             Lab Results   Component Value Date    HGBA1C 9.6 (H) 01/08/2019    HGBA1C 9.4 (H) 03/21/2018    HGBA1C 8.6 (H) 09/11/2017     Lab Results   Component Value Date    MICROALBUR 1.3 01/08/2019    CREATININE 1.24 (H) 01/16/2019         pump with  U 500 insulin      Basal insulin      MN 0.4  2:30 am 0.45  7 am 0.525    --- change all to 0.375    Now    MN 0.375  6 am 0.575  Noon 0.275  6 pm 0.375      Carb  ratio 9, correction 70 - turn wizard off    Set doses 13 for bkfast and 8 for supper   Now 13 and 10        she has ARLENE and CPAP may help to decrease overnight BG rise     symlin not effective, she stopped    victoza resulted in profound hypoglycemia      No sglt2 I     Personal amaury from Jan 9 to Jan 16    avg 222  2% low  70% high     High waking  Lows at 5 pm     Changes as above         Lipid Management     Lab Results   Component Value Date    CHOL 167 01/08/2019    CHOL 155 03/21/2018    CHOL 152 09/11/2017    CHLPL 178 10/28/2016    CHLPL 147 06/20/2016    CHLPL 159 03/17/2016     Lab Results   Component Value Date    TRIG 136 01/08/2019    TRIG 143 03/21/2018    TRIG 96 09/11/2017     Lab Results   Component Value Date    HDL 35 (L) 01/08/2019    HDL 35 (L) 03/21/2018    HDL 34 (L) 09/11/2017     Lab Results   Component Value Date     01/08/2019    LDL 87 03/21/2018    LDL 85 09/11/2017                Lipitor 40 mg tablet, 1 tablet(s) by mouth daily taking      continue     Lipids at goal     Blood Pressure Management:                   cozaar 100 mg daily      metoprolol succinate ER 25 mg tablet,extended release 24 hr, 1 tab daily taking   --     no hctz since Acute on chronic kidney disease     Please refer to nephrology   Microvascular Complication Monitoring:  No Microalbuminuria,  nephropathy ckd stage III, stable      no neuropathy     ==     had vitreous hemorrhage, stop aspirin   Immunizations:  Last influenza immunization 2015, Last pneumococcal immunization has had pneumovax before age 65  Preventive Care:  Patient is not smoking  Weight Related:  Obesity, Counseled on nutrition, Counseled on physical activity  Sleep apnea positive     she will have cpap titration     should use compression stockings     --  start contrave - not effective     exercising and limiting carbs but still not able to lose weight     start saxenda - not covered                      [facility]      Lab Results    Component Value Date    [id number] 58.7 01/08/2019    [id number] 50.1 03/21/2018    [id number] 70.8 09/11/2017       Other Diabetes Related Aspects  Hashimoto's - no since tpo neg  MNG with subcm nodules     US personally reviewed , stable from May 2013 to Nov 2016     Lab Results   Component Value Date    TSH 1.880 03/21/2018     Lab Results   Component Value Date    [id number] 428 01/08/2019          5-14 no celiac disease          4. Follow-up: No Follow-up on file.

## 2019-01-18 ENCOUNTER — TELEPHONE (OUTPATIENT)
Dept: ENDOCRINOLOGY | Facility: CLINIC | Age: 49
End: 2019-01-18

## 2019-01-24 ENCOUNTER — OFFICE VISIT (OUTPATIENT)
Dept: ENDOCRINOLOGY | Facility: CLINIC | Age: 49
End: 2019-01-24

## 2019-01-24 VITALS
DIASTOLIC BLOOD PRESSURE: 76 MMHG | HEIGHT: 65 IN | OXYGEN SATURATION: 98 % | WEIGHT: 257.6 LBS | HEART RATE: 80 BPM | SYSTOLIC BLOOD PRESSURE: 128 MMHG | BODY MASS INDEX: 42.92 KG/M2

## 2019-01-24 DIAGNOSIS — I15.2 HYPERTENSION ASSOCIATED WITH DIABETES (HCC): ICD-10-CM

## 2019-01-24 DIAGNOSIS — E10.8 TYPE 1 DIABETES MELLITUS WITH COMPLICATION (HCC): ICD-10-CM

## 2019-01-24 DIAGNOSIS — E78.2 MIXED DIABETIC HYPERLIPIDEMIA ASSOCIATED WITH TYPE 1 DIABETES MELLITUS (HCC): ICD-10-CM

## 2019-01-24 DIAGNOSIS — E11.59 HYPERTENSION ASSOCIATED WITH DIABETES (HCC): ICD-10-CM

## 2019-01-24 DIAGNOSIS — E10.69 MIXED DIABETIC HYPERLIPIDEMIA ASSOCIATED WITH TYPE 1 DIABETES MELLITUS (HCC): ICD-10-CM

## 2019-01-24 DIAGNOSIS — N18.30 CHRONIC KIDNEY DISEASE (CKD), STAGE III (MODERATE) (HCC): Primary | ICD-10-CM

## 2019-01-24 PROCEDURE — 95249 CONT GLUC MNTR PT PROV EQP: CPT | Performed by: INTERNAL MEDICINE

## 2019-01-24 PROCEDURE — 99214 OFFICE O/P EST MOD 30 MIN: CPT | Performed by: INTERNAL MEDICINE

## 2019-01-24 NOTE — PROGRESS NOTES
Subjective    Jazmine Rodriguez is a 48 y.o. female. she is here today for follow-up.    Diabetes   Pertinent negatives for hypoglycemia include no confusion, dizziness, headaches, nervousness/anxiousness, pallor, seizures, speech difficulty or tremors. Pertinent negatives for diabetes include no chest pain, no fatigue, no polydipsia, no polyphagia, no polyuria and no weakness.           History of Present Illness      followup for thyroid nodule and  diabetes type 1     In regards to thyroid nodule      Duration - diagnosed 40s  Timing - constant  Quality -    controlled  Severity - moderate     Previous Imaging -  last US from Nov 2016 right sup 1.1. cm thyroid nodule , stable when compared to 2013     TSH - Normal      She refers Hashimoto's , TPO ab neg . She is euthyroid.      ---     Type 1 DM     Duration since 10 y   Timing constant  Quality uncontrolled  Severity high - diabetic retinopathy and ckd  Alleviating Factors - Insulin, through insulin pump   Associated symptoms - fatigue           Ho  vitreous hemorrhage related in part to aspirin             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 (CMS/HCC)    • Diabetic retinopathy (CMS/HCC)     prob PDR OD   • Dyslipidemia    • Essential hypertension    • GERD (gastroesophageal reflux disease)    • Non-toxic multinodular goiter    • Nonproliferative diabetic retinopathy (CMS/HCC)     possible occult PDR OD      • Puncture wound without foreign body, right lower leg, initial encounter    • Referred otalgia    • Sinus headache    • Type 1 diabetes mellitus (CMS/HCC)    • Type 2 diabetes  mellitus (CMS/HCC)    • Upper respiratory infection    • Vitreous hemorrhage (CMS/HCC)     s/p vitrectomy OD, doing well        Past Surgical History:   Procedure Laterality Date   • BLADDER SURGERY Bilateral 1973    Bilateral refulx, with progressive deterioration of upper tracts.   •  SECTION  2009    Emergent primary low transverse  section. Intrauterine pregnancy. Chronic hypertension. Superimposed preecalmpsia. Insulin dependent diabetes.   • CYSTOSCOPY  1972    And internal urethrotomy. Bilateral reflux with Walker's membrane.   • INJECTION OF MEDICATION  2016    Kenalog (2)     • INJECTION OF MEDICATION  10/23/2015    Toradol (1)    • PAP SMEAR  2009    Negative     Family History   Problem Relation Age of Onset   • Cancer Paternal Grandfather         Colorectal Cancer   • Diabetes Other    • Hypertension Other    • Thyroid disease Other    • Breast cancer Mother    • Breast cancer Paternal Aunt      OB History      Para Term  AB Living    1 1 1          SAB TAB Ectopic Molar Multiple Live Births                       Current Outpatient Medications   Medication Sig Dispense Refill   • atorvastatin (LIPITOR) 40 MG tablet TAKE 1 TABLET BY MOUTH  DAILY 90 tablet 1   • Continuous Blood Gluc Sensor (Siine YIFAN SENSOR SYSTEM) misc Inject 1 each under the skin As Needed (every 10 days). NDC [address]-21,[facility]: 80019894,[facility]: [id number],[facility]: [id number] 3 each 11   • HUMULIN R 500 UNIT/ML CONCENTRATED injection INJECT 1 TO 1.3 MILLILITERS VIA PUMP DAILY 5 vial 3   • levonorgestrel (MIRENA) 20 MCG/24HR IUD 1 each by Intrauterine route 1 (One) Time.     • losartan (COZAAR) 100 MG tablet TAKE 1 TABLET BY MOUTH  DAILY 90 tablet 1   • metoprolol succinate XL (TOPROL-XL) 25 MG 24 hr tablet TAKE 1 TABLET BY MOUTH  DAILY 90 tablet 1   • ONE TOUCH ULTRA TEST test strip TEST 4 TIMES DAILY 400 each 3     No current facility-administered medications for this visit.  "     Allergies   Allergen Reactions   • Sulfa Antibiotics Shortness Of Breath     Social History     Socioeconomic History   • Marital status:      Spouse name: Not on file   • Number of children: Not on file   • Years of education: Not on file   • Highest education level: Not on file   Tobacco Use   • Smoking status: Never Smoker   • Smokeless tobacco: Never Used       Review of Systems  Review of Systems   Constitutional: Negative for activity change, appetite change, diaphoresis and fatigue.   HENT: Negative for congestion, dental problem, drooling, facial swelling, sneezing, sore throat, tinnitus, trouble swallowing and voice change.    Eyes: Negative for photophobia, pain, discharge, redness, itching and visual disturbance.   Respiratory: Negative for apnea, cough, choking, chest tightness and shortness of breath.    Cardiovascular: Negative for chest pain, palpitations and leg swelling.   Gastrointestinal: Negative for abdominal distention, abdominal pain, constipation, diarrhea, nausea and vomiting.   Endocrine: Negative for cold intolerance, heat intolerance, polydipsia, polyphagia and polyuria.   Genitourinary: Negative for difficulty urinating, dysuria, frequency, hematuria and urgency.   Musculoskeletal: Negative for arthralgias, back pain, gait problem, joint swelling, myalgias, neck pain and neck stiffness.   Skin: Negative for color change, pallor, rash and wound.   Allergic/Immunologic: Negative for environmental allergies, food allergies and immunocompromised state.   Neurological: Negative for dizziness, tremors, seizures, facial asymmetry, speech difficulty, weakness, light-headedness, numbness and headaches.   Hematological: Negative for adenopathy. Does not bruise/bleed easily.   Psychiatric/Behavioral: Negative for agitation, behavioral problems, confusion and sleep disturbance. The patient is not nervous/anxious.         Objective    /76   Pulse 80   Ht 165.1 cm (65\")   Wt 117 " kg (257 lb 9.6 oz)   SpO2 98%   BMI 42.87 kg/m²   Physical Exam   Constitutional: She is oriented to person, place, and time. She appears well-developed and well-nourished. No distress.   HENT:   Head: Normocephalic and atraumatic.   Right Ear: External ear normal.   Left Ear: External ear normal.   Nose: Nose normal.   Eyes: Conjunctivae and EOM are normal. Pupils are equal, round, and reactive to light.   Neck: Normal range of motion. Neck supple. No tracheal deviation present. No thyromegaly present.   Cardiovascular: Normal rate, regular rhythm and normal heart sounds.   No murmur heard.  Pulmonary/Chest: Effort normal and breath sounds normal. No respiratory distress. She has no wheezes.   Abdominal: Soft. Bowel sounds are normal. There is no tenderness. There is no rebound and no guarding.   Musculoskeletal: Normal range of motion. She exhibits no edema, tenderness or deformity.   Neurological: She is alert and oriented to person, place, and time. No cranial nerve deficit.   Skin: Skin is warm and dry. No rash noted.   Psychiatric: She has a normal mood and affect. Her behavior is normal. Judgment and thought content normal.       Lab Review  Glucose (mg/dL)   Date Value   01/16/2019 386 (H)   01/08/2019 254 (H)   03/21/2018 264 (H)     Sodium (mmol/L)   Date Value   01/16/2019 131 (L)   01/08/2019 135 (L)   03/21/2018 140     Potassium (mmol/L)   Date Value   01/16/2019 4.9   01/08/2019 4.5   03/21/2018 4.6     Chloride (mmol/L)   Date Value   01/16/2019 98   01/08/2019 100   03/21/2018 99     CO2 (mmol/L)   Date Value   01/16/2019 25.0   01/08/2019 24.0   03/21/2018 25.0     BUN (mg/dL)   Date Value   01/16/2019 18   01/08/2019 21   03/21/2018 22 (H)     Creatinine (mg/dL)   Date Value   01/16/2019 1.24 (H)   01/08/2019 1.28 (H)   03/21/2018 1.09 (H)     Hemoglobin A1C (%)   Date Value   01/08/2019 9.6 (H)   03/21/2018 9.4 (H)   09/11/2017 8.6 (H)     Triglycerides (mg/dL)   Date Value   01/08/2019 136    03/21/2018 143   09/11/2017 96     LDL Cholesterol  (mg/dL)   Date Value   01/08/2019 107   03/21/2018 87   09/11/2017 85       Assessment/Plan      1. Chronic kidney disease (CKD), stage III (moderate) (CMS/[facility])    2. Type 1 diabetes mellitus with complication (CMS/[facility])    3. Hypertension associated with diabetes (CMS/[facility])    4. Mixed diabetic hyperlipidemia associated with type 1 diabetes mellitus (CMS/[facility])    .    Medications prescribed:  Outpatient Encounter Medications as of 1/24/2019   Medication Sig Dispense Refill   • atorvastatin (LIPITOR) 40 MG tablet TAKE 1 TABLET BY MOUTH  DAILY 90 tablet 1   • Continuous Blood Gluc Sensor (FREESTYLE YIFAN SENSOR SYSTEM) misc Inject 1 each under the skin As Needed (every 10 days). NDC [address]-50,[facility]: [id number],[facility]: [id number],[facility]: [id number] 3 each 11   • HUMULIN R 500 UNIT/ML CONCENTRATED injection INJECT 1 TO 1.3 MILLILITERS VIA PUMP DAILY 5 vial 3   • levonorgestrel (MIRENA) 20 MCG/24HR IUD 1 each by Intrauterine route 1 (One) Time.     • losartan (COZAAR) 100 MG tablet TAKE 1 TABLET BY MOUTH  DAILY 90 tablet 1   • metoprolol succinate XL (TOPROL-XL) 25 MG 24 hr tablet TAKE 1 TABLET BY MOUTH  DAILY 90 tablet 1   • ONE TOUCH ULTRA TEST test strip TEST 4 TIMES DAILY 400 each 3     No facility-administered encounter medications on file as of 1/24/2019.        Orders placed during this encounter include:  Orders Placed This Encounter   Procedures   • Ambulatory Referral to Nephrology     Referral Priority:   Routine     Referral Type:   Consultation     Referral Reason:   Specialty Services Required     Requested Specialty:   Nephrology     Number of Visits Requested:   1       Glycemic management             Lab Results   Component Value Date    HGBA1C 9.6 (H) 01/08/2019    HGBA1C 9.4 (H) 03/21/2018    HGBA1C 8.6 (H) 09/11/2017     Lab Results   Component Value Date    MICROALBUR 1.3 01/08/2019    CREATININE 1.24 (H) 01/16/2019         pump with  U 500 insulin       Basal insulin      MN 0.375  6 am 0.375 - 0.575 - 0.625  Noon 0.275 -- 0.225  6 pm 0.375       Carb ratio 9, correction 70 - turn wizard off    Set doses 13 for bkfast and 8 for supper   Now 13 and 10 ( keep 8 )        she has ARLENE and CPAP may help to decrease overnight BG rise     symlin not effective, she stopped    victoza resulted in profound hypoglycemia      No sglt2 I     Personal amaury from Jan 11-24  avg 222 --- 194  2% low--  3%  70% high - 57%          Lipid Management     Lab Results   Component Value Date    CHOL 167 01/08/2019    CHOL 155 03/21/2018    CHOL 152 09/11/2017    CHLPL 178 10/28/2016    CHLPL 147 06/20/2016    CHLPL 159 03/17/2016     Lab Results   Component Value Date    TRIG 136 01/08/2019    TRIG 143 03/21/2018    TRIG 96 09/11/2017     Lab Results   Component Value Date    HDL 35 (L) 01/08/2019    HDL 35 (L) 03/21/2018    HDL 34 (L) 09/11/2017     Lab Results   Component Value Date     01/08/2019    LDL 87 03/21/2018    LDL 85 09/11/2017                Lipitor 40 mg tablet, 1 tablet(s) by mouth daily taking      continue     Lipids at goal     Blood Pressure Management:                   cozaar 100 mg daily      metoprolol succinate ER 25 mg tablet,extended release 24 hr, 1 tab daily taking   --     no hctz since Acute on chronic kidney disease     Please refer to nephrology   Microvascular Complication Monitoring:  No Microalbuminuria,  nephropathy ckd stage III, stable      no neuropathy     ==     had vitreous hemorrhage, stop aspirin   Immunizations:  Last influenza immunization 2015, Last pneumococcal immunization has had pneumovax before age 65  Preventive Care:  Patient is not smoking  Weight Related:  Obesity, Counseled on nutrition, Counseled on physical activity  Sleep apnea positive     she will have cpap titration     should use compression stockings     --  start contrave - not effective     exercising and limiting carbs but still not able to lose weight      start saxenda - not covered                      [facility]      Lab Results   Component Value Date    XZAF13VT 58.7 01/08/2019    TBRG97II 50.1 03/21/2018    ZTEG43OL 70.8 09/11/2017       Other Diabetes Related Aspects  Hashimoto's - no since tpo neg  MNG with subcm nodules     US personally reviewed , stable from May 2013 to Nov 2016     Lab Results   Component Value Date    TSH 1.880 03/21/2018     Lab Results   Component Value Date    [id number] 428 01/08/2019          5-14 no celiac disease          4. Follow-up: No Follow-up on file.

## 2019-02-06 ENCOUNTER — OFFICE VISIT (OUTPATIENT)
Dept: ENDOCRINOLOGY | Facility: CLINIC | Age: 49
End: 2019-02-06

## 2019-02-06 VITALS
SYSTOLIC BLOOD PRESSURE: 126 MMHG | HEART RATE: 82 BPM | HEIGHT: 65 IN | BODY MASS INDEX: 42.93 KG/M2 | OXYGEN SATURATION: 98 % | DIASTOLIC BLOOD PRESSURE: 74 MMHG | WEIGHT: 257.7 LBS

## 2019-02-06 DIAGNOSIS — I15.2 HYPERTENSION ASSOCIATED WITH DIABETES (HCC): ICD-10-CM

## 2019-02-06 DIAGNOSIS — E11.59 HYPERTENSION ASSOCIATED WITH DIABETES (HCC): ICD-10-CM

## 2019-02-06 DIAGNOSIS — E04.2 NONTOXIC MULTINODULAR GOITER: ICD-10-CM

## 2019-02-06 DIAGNOSIS — E53.8 B12 DEFICIENCY: ICD-10-CM

## 2019-02-06 DIAGNOSIS — E78.2 MIXED DIABETIC HYPERLIPIDEMIA ASSOCIATED WITH TYPE 1 DIABETES MELLITUS (HCC): ICD-10-CM

## 2019-02-06 DIAGNOSIS — E10.8 TYPE 1 DIABETES MELLITUS WITH COMPLICATION (HCC): Primary | ICD-10-CM

## 2019-02-06 DIAGNOSIS — E55.9 VITAMIN D DEFICIENCY: ICD-10-CM

## 2019-02-06 DIAGNOSIS — E10.69 MIXED DIABETIC HYPERLIPIDEMIA ASSOCIATED WITH TYPE 1 DIABETES MELLITUS (HCC): ICD-10-CM

## 2019-02-06 DIAGNOSIS — N18.30 CHRONIC KIDNEY DISEASE (CKD), STAGE III (MODERATE) (HCC): ICD-10-CM

## 2019-02-06 PROCEDURE — 99213 OFFICE O/P EST LOW 20 MIN: CPT | Performed by: INTERNAL MEDICINE

## 2019-02-27 ENCOUNTER — TRANSCRIBE ORDERS (OUTPATIENT)
Dept: LAB | Facility: HOSPITAL | Age: 49
End: 2019-02-27

## 2019-02-27 DIAGNOSIS — R94.4 ABNORMAL RESULTS OF KIDNEY FUNCTION STUDIES: Primary | ICD-10-CM

## 2019-03-19 ENCOUNTER — APPOINTMENT (OUTPATIENT)
Dept: LAB | Facility: HOSPITAL | Age: 49
End: 2019-03-19

## 2019-03-19 LAB
25(OH)D3 SERPL-MCNC: 46.5 NG/ML (ref 30–100)
ALBUMIN SERPL-MCNC: 4.1 G/DL (ref 3.4–4.8)
ANION GAP SERPL CALCULATED.3IONS-SCNC: 10 MMOL/L (ref 5–15)
BACTERIA UR QL AUTO: ABNORMAL /HPF
BILIRUB UR QL STRIP: NEGATIVE
BUN BLD-MCNC: 15 MG/DL (ref 7–21)
BUN/CREAT SERPL: 13.5 (ref 7–25)
CALCIUM SPEC-SCNC: 9.2 MG/DL (ref 8.4–10.2)
CHLORIDE SERPL-SCNC: 99 MMOL/L (ref 95–110)
CK SERPL-CCNC: 135 U/L (ref 30–135)
CLARITY UR: ABNORMAL
CO2 SERPL-SCNC: 25 MMOL/L (ref 22–31)
COLOR UR: YELLOW
CREAT BLD-MCNC: 1.11 MG/DL (ref 0.5–1)
CREAT UR-MCNC: 151.3 MG/DL
ERYTHROCYTE [SEDIMENTATION RATE] IN BLOOD: 23 MM/HR (ref 0–20)
GFR SERPL CREATININE-BSD FRML MDRD: 52 ML/MIN/1.73 (ref 58–135)
GLUCOSE BLD-MCNC: 236 MG/DL (ref 60–100)
GLUCOSE UR STRIP-MCNC: ABNORMAL MG/DL
HGB UR QL STRIP.AUTO: NEGATIVE
HYALINE CASTS UR QL AUTO: ABNORMAL /LPF
KETONES UR QL STRIP: NEGATIVE
LEUKOCYTE ESTERASE UR QL STRIP.AUTO: NEGATIVE
NITRITE UR QL STRIP: NEGATIVE
PH UR STRIP.AUTO: 5.5 [PH] (ref 5–9)
PHOSPHATE SERPL-MCNC: 3.9 MG/DL (ref 2.4–4.4)
POTASSIUM BLD-SCNC: 4.1 MMOL/L (ref 3.5–5.1)
PROT UR QL STRIP: NEGATIVE
PROT UR-MCNC: 6.2 MG/DL
PROT/CREAT UR: 41 MG/G CREA (ref 0–200)
RBC # UR: ABNORMAL /HPF
REF LAB TEST METHOD: ABNORMAL
SODIUM BLD-SCNC: 134 MMOL/L (ref 137–145)
SP GR UR STRIP: 1.02 (ref 1–1.03)
SQUAMOUS #/AREA URNS HPF: ABNORMAL /HPF
UROBILINOGEN UR QL STRIP: ABNORMAL
WBC UR QL AUTO: ABNORMAL /HPF

## 2019-03-19 PROCEDURE — 80069 RENAL FUNCTION PANEL: CPT | Performed by: INTERNAL MEDICINE

## 2019-03-19 PROCEDURE — 82550 ASSAY OF CK (CPK): CPT | Performed by: INTERNAL MEDICINE

## 2019-03-19 PROCEDURE — 82570 ASSAY OF URINE CREATININE: CPT | Performed by: INTERNAL MEDICINE

## 2019-03-19 PROCEDURE — 82306 VITAMIN D 25 HYDROXY: CPT | Performed by: INTERNAL MEDICINE

## 2019-03-19 PROCEDURE — 36415 COLL VENOUS BLD VENIPUNCTURE: CPT | Performed by: INTERNAL MEDICINE

## 2019-03-19 PROCEDURE — 85651 RBC SED RATE NONAUTOMATED: CPT | Performed by: INTERNAL MEDICINE

## 2019-03-19 PROCEDURE — 81001 URINALYSIS AUTO W/SCOPE: CPT | Performed by: INTERNAL MEDICINE

## 2019-03-19 PROCEDURE — 84156 ASSAY OF PROTEIN URINE: CPT | Performed by: INTERNAL MEDICINE

## 2019-03-19 PROCEDURE — 83970 ASSAY OF PARATHORMONE: CPT | Performed by: INTERNAL MEDICINE

## 2019-03-20 LAB — PTH-INTACT SERPL-MCNC: 41.3 PG/ML (ref 10–65)

## 2019-04-08 RX ORDER — ATORVASTATIN CALCIUM 40 MG/1
TABLET, FILM COATED ORAL
Qty: 90 TABLET | Refills: 1 | Status: SHIPPED | OUTPATIENT
Start: 2019-04-08 | End: 2019-08-26 | Stop reason: SDUPTHER

## 2019-04-08 RX ORDER — METOPROLOL SUCCINATE 25 MG/1
TABLET, EXTENDED RELEASE ORAL
Qty: 90 TABLET | Refills: 1 | Status: SHIPPED | OUTPATIENT
Start: 2019-04-08 | End: 2019-08-26 | Stop reason: SDUPTHER

## 2019-05-09 RX ORDER — LOSARTAN POTASSIUM 100 MG/1
100 TABLET ORAL DAILY
Qty: 90 TABLET | Refills: 1 | Status: SHIPPED | OUTPATIENT
Start: 2019-05-09 | End: 2019-09-26 | Stop reason: SDUPTHER

## 2019-05-16 ENCOUNTER — TELEPHONE (OUTPATIENT)
Dept: ENDOCRINOLOGY | Facility: CLINIC | Age: 49
End: 2019-05-16

## 2019-05-16 RX ORDER — FLASH GLUCOSE SENSOR
KIT MISCELLANEOUS
Refills: 10 | OUTPATIENT
Start: 2019-05-16

## 2019-05-16 NOTE — TELEPHONE ENCOUNTER
Pt says Ellyn called us to get refill and has not heard back for Freestyle Joanie . Pt says she has really enjoyed using it and would like to continue    Please call at [phone]

## 2019-05-17 RX ORDER — FLASH GLUCOSE SCANNING READER
1 EACH MISCELLANEOUS DAILY
Qty: 1 DEVICE | Refills: 1 | Status: SHIPPED | OUTPATIENT
Start: 2019-05-17 | End: 2020-08-03

## 2019-05-17 RX ORDER — FLASH GLUCOSE SENSOR
1 KIT MISCELLANEOUS
Qty: 2 EACH | Refills: 11 | Status: SHIPPED | OUTPATIENT
Start: 2019-05-17 | End: 2020-06-02

## 2019-06-17 ENCOUNTER — TRANSCRIBE ORDERS (OUTPATIENT)
Dept: LAB | Facility: HOSPITAL | Age: 49
End: 2019-06-17

## 2019-06-17 DIAGNOSIS — R94.4 ABNORMAL RENAL FUNCTION TEST: Primary | ICD-10-CM

## 2019-06-18 ENCOUNTER — LAB (OUTPATIENT)
Dept: LAB | Facility: HOSPITAL | Age: 49
End: 2019-06-18

## 2019-06-18 DIAGNOSIS — R94.4 ABNORMAL RENAL FUNCTION TEST: ICD-10-CM

## 2019-06-18 LAB
ALBUMIN SERPL-MCNC: 4.1 G/DL (ref 3.5–5.2)
ANION GAP SERPL CALCULATED.3IONS-SCNC: 10.4 MMOL/L
BUN BLD-MCNC: 15 MG/DL (ref 6–20)
BUN/CREAT SERPL: 12.8 (ref 7–25)
CALCIUM SPEC-SCNC: 9.7 MG/DL (ref 8.6–10.5)
CHLORIDE SERPL-SCNC: 100 MMOL/L (ref 98–107)
CO2 SERPL-SCNC: 26.6 MMOL/L (ref 22–29)
CREAT BLD-MCNC: 1.17 MG/DL (ref 0.57–1)
CREAT UR-MCNC: 112.1 MG/DL
GFR SERPL CREATININE-BSD FRML MDRD: 49 ML/MIN/1.73
GLUCOSE BLD-MCNC: 201 MG/DL (ref 65–99)
PHOSPHATE SERPL-MCNC: 3.4 MG/DL (ref 2.5–4.5)
POTASSIUM BLD-SCNC: 4.4 MMOL/L (ref 3.5–5.2)
PROT UR-MCNC: 5 MG/DL
PROT/CREAT UR: 44.6 MG/G CREA (ref 0–200)
SODIUM BLD-SCNC: 137 MMOL/L (ref 136–145)

## 2019-06-18 PROCEDURE — 82570 ASSAY OF URINE CREATININE: CPT

## 2019-06-18 PROCEDURE — 36415 COLL VENOUS BLD VENIPUNCTURE: CPT

## 2019-06-18 PROCEDURE — 84156 ASSAY OF PROTEIN URINE: CPT

## 2019-06-18 PROCEDURE — 80069 RENAL FUNCTION PANEL: CPT

## 2019-08-27 RX ORDER — METOPROLOL SUCCINATE 25 MG/1
TABLET, EXTENDED RELEASE ORAL
Qty: 90 TABLET | Refills: 0 | Status: SHIPPED | OUTPATIENT
Start: 2019-08-27 | End: 2019-11-19 | Stop reason: SDUPTHER

## 2019-08-27 RX ORDER — ATORVASTATIN CALCIUM 40 MG/1
TABLET, FILM COATED ORAL
Qty: 90 TABLET | Refills: 0 | Status: SHIPPED | OUTPATIENT
Start: 2019-08-27 | End: 2019-11-19 | Stop reason: SDUPTHER

## 2019-09-23 ENCOUNTER — TRANSCRIBE ORDERS (OUTPATIENT)
Dept: LAB | Facility: HOSPITAL | Age: 49
End: 2019-09-23

## 2019-09-23 DIAGNOSIS — N18.30 CHRONIC KIDNEY DISEASE, STAGE III (MODERATE) (HCC): Primary | ICD-10-CM

## 2019-09-27 RX ORDER — LOSARTAN POTASSIUM 100 MG/1
100 TABLET ORAL DAILY
Qty: 90 TABLET | Refills: 0 | Status: SHIPPED | OUTPATIENT
Start: 2019-09-27 | End: 2019-12-23

## 2019-11-18 RX ORDER — INSULIN HUMAN 500 [IU]/ML
INJECTION, SOLUTION SUBCUTANEOUS
Qty: 40 ML | Refills: 2 | Status: SHIPPED | OUTPATIENT
Start: 2019-11-18 | End: 2020-02-13 | Stop reason: SDUPTHER

## 2019-11-20 RX ORDER — METOPROLOL SUCCINATE 25 MG/1
TABLET, EXTENDED RELEASE ORAL
Qty: 90 TABLET | Refills: 0 | Status: SHIPPED | OUTPATIENT
Start: 2019-11-20 | End: 2020-08-04 | Stop reason: SDUPTHER

## 2019-11-20 RX ORDER — ATORVASTATIN CALCIUM 40 MG/1
TABLET, FILM COATED ORAL
Qty: 90 TABLET | Refills: 0 | Status: SHIPPED | OUTPATIENT
Start: 2019-11-20 | End: 2020-10-01 | Stop reason: SDUPTHER

## 2019-12-23 ENCOUNTER — TRANSCRIBE ORDERS (OUTPATIENT)
Dept: LAB | Facility: HOSPITAL | Age: 49
End: 2019-12-23

## 2019-12-23 DIAGNOSIS — N18.30 CHRONIC RENAL DISEASE, STAGE III (HCC): Primary | ICD-10-CM

## 2019-12-23 RX ORDER — LOSARTAN POTASSIUM 100 MG/1
100 TABLET ORAL DAILY
Qty: 30 TABLET | Refills: 0 | Status: SHIPPED | OUTPATIENT
Start: 2019-12-23 | End: 2020-04-03 | Stop reason: SDUPTHER

## 2020-01-24 ENCOUNTER — LAB (OUTPATIENT)
Dept: LAB | Facility: HOSPITAL | Age: 50
End: 2020-01-24

## 2020-01-24 DIAGNOSIS — N18.30 CHRONIC RENAL DISEASE, STAGE III (HCC): ICD-10-CM

## 2020-01-24 LAB
25(OH)D3 SERPL-MCNC: 37.2 NG/ML (ref 30–100)
ALBUMIN SERPL-MCNC: 4.1 G/DL (ref 3.5–5.2)
ALBUMIN UR-MCNC: <1.2 MG/DL
ANION GAP SERPL CALCULATED.3IONS-SCNC: 14.7 MMOL/L (ref 5–15)
BUN BLD-MCNC: 17 MG/DL (ref 6–20)
BUN/CREAT SERPL: 13.6 (ref 7–25)
CALCIUM SPEC-SCNC: 9.5 MG/DL (ref 8.6–10.5)
CHLORIDE SERPL-SCNC: 95 MMOL/L (ref 98–107)
CO2 SERPL-SCNC: 23.3 MMOL/L (ref 22–29)
CREAT BLD-MCNC: 1.25 MG/DL (ref 0.57–1)
CREAT UR-MCNC: 107.7 MG/DL
GFR SERPL CREATININE-BSD FRML MDRD: 46 ML/MIN/1.73
GLUCOSE BLD-MCNC: 240 MG/DL (ref 65–99)
MICROALBUMIN/CREAT UR: NORMAL MG/G{CREAT}
PHOSPHATE SERPL-MCNC: 3.2 MG/DL (ref 2.5–4.5)
POTASSIUM BLD-SCNC: 4.7 MMOL/L (ref 3.5–5.2)
SODIUM BLD-SCNC: 133 MMOL/L (ref 136–145)

## 2020-01-24 PROCEDURE — 82570 ASSAY OF URINE CREATININE: CPT

## 2020-01-24 PROCEDURE — 80069 RENAL FUNCTION PANEL: CPT

## 2020-01-24 PROCEDURE — 36415 COLL VENOUS BLD VENIPUNCTURE: CPT

## 2020-01-24 PROCEDURE — 82043 UR ALBUMIN QUANTITATIVE: CPT

## 2020-01-24 PROCEDURE — 82306 VITAMIN D 25 HYDROXY: CPT

## 2020-02-10 ENCOUNTER — TRANSCRIBE ORDERS (OUTPATIENT)
Dept: LAB | Facility: HOSPITAL | Age: 50
End: 2020-02-10

## 2020-02-10 DIAGNOSIS — N18.30 CHRONIC KIDNEY DISEASE, STAGE III (MODERATE) (HCC): Primary | ICD-10-CM

## 2020-02-14 ENCOUNTER — TELEPHONE (OUTPATIENT)
Dept: ENDOCRINOLOGY | Facility: CLINIC | Age: 50
End: 2020-02-14

## 2020-03-03 ENCOUNTER — TELEPHONE (OUTPATIENT)
Dept: FAMILY MEDICINE CLINIC | Facility: CLINIC | Age: 50
End: 2020-03-03

## 2020-03-03 DIAGNOSIS — E55.9 VITAMIN D DEFICIENCY: ICD-10-CM

## 2020-03-03 DIAGNOSIS — E10.8 TYPE 1 DIABETES MELLITUS WITH COMPLICATION (HCC): Primary | ICD-10-CM

## 2020-03-03 NOTE — TELEPHONE ENCOUNTER
PT HAS APPT IN MAY FOR FOLLOW UP. NEEDS LAB ORDERS SO SHE CAN HAVE LABS PRIOR TO APPT. CALL HER [phone]         Documentation

## 2020-03-03 NOTE — TELEPHONE ENCOUNTER
PT HAS APPT IN MAY FOR FOLLOW UP. NEEDS LAB ORDERS SO SHE CAN HAVE LABS PRIOR TO APPT. CALL HER [phone]

## 2020-04-03 RX ORDER — LOSARTAN POTASSIUM 100 MG/1
100 TABLET ORAL DAILY
Qty: 30 TABLET | Refills: 0 | Status: SHIPPED | OUTPATIENT
Start: 2020-04-03 | End: 2020-10-01 | Stop reason: SDUPTHER

## 2020-04-08 ENCOUNTER — TELEMEDICINE (OUTPATIENT)
Dept: ENDOCRINOLOGY | Facility: CLINIC | Age: 50
End: 2020-04-08

## 2020-04-08 DIAGNOSIS — E10.22 TYPE 1 DIABETES MELLITUS WITH STAGE 3 CHRONIC KIDNEY DISEASE (HCC): ICD-10-CM

## 2020-04-08 DIAGNOSIS — E10.8 TYPE 1 DIABETES MELLITUS WITH COMPLICATION (HCC): Primary | ICD-10-CM

## 2020-04-08 DIAGNOSIS — E11.59 HYPERTENSION ASSOCIATED WITH DIABETES (HCC): ICD-10-CM

## 2020-04-08 DIAGNOSIS — E53.8 B12 DEFICIENCY: ICD-10-CM

## 2020-04-08 DIAGNOSIS — N18.30 CHRONIC KIDNEY DISEASE (CKD), STAGE III (MODERATE) (HCC): ICD-10-CM

## 2020-04-08 DIAGNOSIS — E55.9 VITAMIN D DEFICIENCY: ICD-10-CM

## 2020-04-08 DIAGNOSIS — I15.2 HYPERTENSION ASSOCIATED WITH DIABETES (HCC): ICD-10-CM

## 2020-04-08 DIAGNOSIS — E78.2 MIXED DIABETIC HYPERLIPIDEMIA ASSOCIATED WITH TYPE 1 DIABETES MELLITUS (HCC): ICD-10-CM

## 2020-04-08 DIAGNOSIS — E04.2 NONTOXIC MULTINODULAR GOITER: ICD-10-CM

## 2020-04-08 DIAGNOSIS — N18.30 TYPE 1 DIABETES MELLITUS WITH STAGE 3 CHRONIC KIDNEY DISEASE (HCC): ICD-10-CM

## 2020-04-08 DIAGNOSIS — E10.69 MIXED DIABETIC HYPERLIPIDEMIA ASSOCIATED WITH TYPE 1 DIABETES MELLITUS (HCC): ICD-10-CM

## 2020-04-08 PROCEDURE — 95251 CONT GLUC MNTR ANALYSIS I&R: CPT | Performed by: INTERNAL MEDICINE

## 2020-04-08 PROCEDURE — 99214 OFFICE O/P EST MOD 30 MIN: CPT | Performed by: INTERNAL MEDICINE

## 2020-04-08 NOTE — PROGRESS NOTES
Subjective    Jazmine Rodriguez is a 49 y.o. female. she is here today for follow-up.    Diabetes   Pertinent negatives for hypoglycemia include no confusion, dizziness, headaches, nervousness/anxiousness, pallor, seizures, speech difficulty or tremors. Pertinent negatives for diabetes include no chest pain, no fatigue, no polydipsia, no polyphagia, no polyuria and no weakness.                                     TELEHEALTH VISIT    This was a Telehealth Encounter. Benefits and Disadvantages of a Telehealth Visit were discussed and accepted by patient. .  Patient agreed to receive service through Telehealth visit as patient is being compliant with social distancing recommendations imparted by CDC.     You have chosen to receive care through a telehealth visit.  Do you consent to use a video/audio connection for your medical care today? Yes          History of Present Illness     Diabetes type 1    Duration since 10 y   Timing constant  Quality uncontrolled  Severity high - diabetic retinopathy and ckd  Alleviating Factors - Insulin, through insulin pump   Associated symptoms - fatigue      Ho  vitreous hemorrhage related in part to aspirin, stable ckd     Uses u500 insulin     ---    Thyroid Nodule    Duration - diagnosed 40s  Timing - constant  Quality -    controlled        Previous Imaging -  last US from Nov 2016 right sup 1.1. cm thyroid nodule , stable when compared to 2013     TSH - Normal      She refers Hashimoto's , TPO ab neg . She is euthyroid.                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 (CMS/HCC)    • Diabetic retinopathy  (CMS/HCC)     prob PDR OD   • Dyslipidemia    • Essential hypertension    • GERD (gastroesophageal reflux disease)    • Non-toxic multinodular goiter    • Nonproliferative diabetic retinopathy (CMS/HCC)     possible occult PDR OD      • Puncture wound without foreign body, right lower leg, initial encounter    • Referred otalgia    • Sinus headache    • Type 1 diabetes mellitus (CMS/HCC)    • Type 2 diabetes mellitus (CMS/HCC)    • Upper respiratory infection    • Vitreous hemorrhage (CMS/HCC)     s/p vitrectomy OD, doing well        Past Surgical History:   Procedure Laterality Date   • BLADDER SURGERY Bilateral 1973    Bilateral refulx, with progressive deterioration of upper tracts.   •  SECTION  2009    Emergent primary low transverse  section. Intrauterine pregnancy. Chronic hypertension. Superimposed preecalmpsia. Insulin dependent diabetes.   • CYSTOSCOPY  1972    And internal urethrotomy. Bilateral reflux with Walker's membrane.   • INJECTION OF MEDICATION  2016    Kenalog (2)     • INJECTION OF MEDICATION  10/23/2015    Toradol (1)    • PAP SMEAR  2009    Negative     Family History   Problem Relation Age of Onset   • Cancer Paternal Grandfather         Colorectal Cancer   • Diabetes Other    • Hypertension Other    • Thyroid disease Other    • Breast cancer Mother    • Breast cancer Paternal Aunt      OB History        1    Para   1    Term   1            AB        Living           SAB        TAB        Ectopic        Molar        Multiple        Live Births                  Current Outpatient Medications   Medication Sig Dispense Refill   • atorvastatin (LIPITOR) 40 MG tablet TAKE 1 TABLET BY MOUTH  DAILY 90 tablet 0   • Continuous Blood Gluc  (FREESTYLE YIFAN 14 DAY READER) device 1 each Daily. Use as indicated 1 Device 1   • Continuous Blood Gluc Sensor (FREESTYLE YIFAN 14 DAY SENSOR) misc 1 each Every 14 (Fourteen) Days. 2 each 11   •  insulin regular (HUMULIN R) 500 UNIT/ML CONCENTRATED injection Inject 1 to 1.3 ml via pump daily 40 mL 2   • levonorgestrel (MIRENA) 20 MCG/24HR IUD 1 each by Intrauterine route 1 (One) Time.     • losartan (COZAAR) 100 MG tablet Take 1 tablet by mouth Daily. 30 tablet 0   • metoprolol succinate XL (TOPROL-XL) 25 MG 24 hr tablet TAKE 1 TABLET BY MOUTH  DAILY 90 tablet 0   • ONE TOUCH ULTRA TEST test strip TEST 4 TIMES DAILY 400 each 3   • promethazine-dextromethorphan (PROMETHAZINE-DM) 6.25-15 MG/5ML syrup Take one teaspoon qhs prn cough 120 mL 0     No current facility-administered medications for this visit.      Allergies   Allergen Reactions   • Sulfa Antibiotics Shortness Of Breath     Social History     Socioeconomic History   • Marital status:      Spouse name: Not on file   • Number of children: Not on file   • Years of education: Not on file   • Highest education level: Not on file   Tobacco Use   • Smoking status: Never Smoker   • Smokeless tobacco: Never Used       Review of Systems  Review of Systems   Constitutional: Negative for activity change, appetite change, diaphoresis and fatigue.   HENT: Negative for congestion, dental problem, drooling, facial swelling, sneezing, sore throat, tinnitus, trouble swallowing and voice change.    Eyes: Negative for photophobia, pain, discharge, redness, itching and visual disturbance.   Respiratory: Negative for apnea, cough, choking, chest tightness and shortness of breath.    Cardiovascular: Negative for chest pain, palpitations and leg swelling.   Gastrointestinal: Negative for abdominal distention, abdominal pain, constipation, diarrhea, nausea and vomiting.   Endocrine: Negative for cold intolerance, heat intolerance, polydipsia, polyphagia and polyuria.   Genitourinary: Negative for difficulty urinating, dysuria, frequency, hematuria and urgency.   Musculoskeletal: Negative for arthralgias, back pain, gait problem, joint swelling, myalgias, neck pain  and neck stiffness.   Skin: Negative for color change, pallor, rash and wound.   Allergic/Immunologic: Negative for environmental allergies, food allergies and immunocompromised state.   Neurological: Negative for dizziness, tremors, seizures, facial asymmetry, speech difficulty, weakness, light-headedness, numbness and headaches.   Hematological: Negative for adenopathy. Does not bruise/bleed easily.   Psychiatric/Behavioral: Negative for agitation, behavioral problems, confusion and sleep disturbance. The patient is not nervous/anxious.         Objective    There were no vitals taken for this visit.  Physical Exam   Constitutional: She is oriented to person, place, and time. She appears well-developed and well-nourished. No distress.   HENT:   Head: Normocephalic and atraumatic.   Right Ear: External ear normal.   Left Ear: External ear normal.   Nose: Nose normal.   Eyes: Pupils are equal, round, and reactive to light. Conjunctivae and EOM are normal. Right eye exhibits no discharge. Left eye exhibits no discharge. No scleral icterus.   Neck: Normal range of motion. Neck supple. No tracheal deviation present.   Pulmonary/Chest: Effort normal. No respiratory distress. She has no wheezes.   Musculoskeletal: Normal range of motion. She exhibits no edema or deformity.   Neurological: She is alert and oriented to person, place, and time. No cranial nerve deficit.   Skin: Skin is warm and dry. No rash noted. She is not diaphoretic. No erythema. No pallor.   Psychiatric: She has a normal mood and affect. Her behavior is normal. Judgment and thought content normal.       Lab Review  Glucose (mg/dL)   Date Value   01/24/2020 240 (H)   06/18/2019 201 (H)   03/19/2019 236 (H)     Sodium (mmol/L)   Date Value   01/24/2020 133 (L)   06/18/2019 137   03/19/2019 134 (L)     Potassium (mmol/L)   Date Value   01/24/2020 4.7   06/18/2019 4.4   03/19/2019 4.1     Chloride (mmol/L)   Date Value   01/24/2020 95 (L)   06/18/2019 100    03/19/2019 99     CO2 (mmol/L)   Date Value   01/24/2020 23.3   06/18/2019 26.6   03/19/2019 25.0     BUN (mg/dL)   Date Value   01/24/2020 17   06/18/2019 15   03/19/2019 15     Creatinine (mg/dL)   Date Value   01/24/2020 1.25 (H)   06/18/2019 1.17 (H)   03/19/2019 1.11 (H)     Hemoglobin A1C (%)   Date Value   01/08/2019 9.6 (H)   03/21/2018 9.4 (H)   09/11/2017 8.6 (H)     Triglycerides (mg/dL)   Date Value   01/08/2019 136   03/21/2018 143   09/11/2017 96     LDL Cholesterol  (mg/dL)   Date Value   01/08/2019 107   03/21/2018 87   09/11/2017 85       Assessment/Plan      1. Type 1 diabetes mellitus with complication (CMS/[facility])    2. Hypertension associated with diabetes (CMS/[facility])    3. Mixed diabetic hyperlipidemia associated with type 1 diabetes mellitus (CMS/[facility])    4. Vitamin D deficiency    5. B12 deficiency    6. Chronic kidney disease (CKD), stage III (moderate) (CMS/[facility])    7. Nontoxic multinodular goiter    8. Type 1 diabetes mellitus with stage 3 chronic kidney disease (CMS/[facility])    .    Medications prescribed:  Outpatient Encounter Medications as of 4/8/2020   Medication Sig Dispense Refill   • atorvastatin (LIPITOR) 40 MG tablet TAKE 1 TABLET BY MOUTH  DAILY 90 tablet 0   • Continuous Blood Gluc  (FREESTYLE YIFAN 14 DAY READER) device 1 each Daily. Use as indicated 1 Device 1   • Continuous Blood Gluc Sensor (FREESTYLE YIFAN 14 DAY SENSOR) misc 1 each Every 14 (Fourteen) Days. 2 each 11   • insulin regular (HUMULIN R) 500 UNIT/ML CONCENTRATED injection Inject 1 to 1.3 ml via pump daily 40 mL 2   • levonorgestrel (MIRENA) 20 MCG/24HR IUD 1 each by Intrauterine route 1 (One) Time.     • losartan (COZAAR) 100 MG tablet Take 1 tablet by mouth Daily. 30 tablet 0   • metoprolol succinate XL (TOPROL-XL) 25 MG 24 hr tablet TAKE 1 TABLET BY MOUTH  DAILY 90 tablet 0   • ONE TOUCH ULTRA TEST test strip TEST 4 TIMES DAILY 400 each 3   • promethazine-dextromethorphan (PROMETHAZINE-DM) 6.25-15 MG/5ML syrup  Take one teaspoon qhs prn cough 120 mL 0   • [DISCONTINUED] azithromycin (ZITHROMAX) 250 MG tablet Take 2 tablets the first day, then 1 tablet daily for 4 days. 6 tablet 0     No facility-administered encounter medications on file as of 4/8/2020.        Orders placed during this encounter include:  Orders Placed This Encounter   Procedures   • CBC Auto Differential   • Comprehensive Metabolic Panel   • Hemoglobin A1c   • Lipid Panel   • Microalbumin / Creatinine Urine Ratio - Urine, Clean Catch   • TSH   • Vitamin B12   • Vitamin D 25 Hydroxy       Glycemic management             Lab Results   Component Value Date    HGBA1C 9.6 (H) 01/08/2019    HGBA1C 9.4 (H) 03/21/2018    HGBA1C 8.6 (H) 09/11/2017     Lab Results   Component Value Date    MICROALBUR <1.2 01/24/2020    CREATININE 1.25 (H) 01/24/2020     Joanie reviewed for the past 2 weeks               When she took 8 units before supper on April 15 she had excellent overnight glucose on April 16    She needs to be good at guesstimating U500 with carb intake    I will monitor her progress   No changes to pump     pump with  U 500 insulin      Basal insulin      MN 0.375 0.425  6-4 am 0.375 - 0.575 - 0.625 0.675  Noon 0.275 -- 0.225  6 pm 0.375    Carb ratio 9, correction 70 - turn wizard off    Set doses 13 for bkfast and 8 for supper working      she has ARLENE and CPAP may help to decrease overnight BG rise     symlin not effective, she stopped    victoza resulted in profound hypoglycemia      No sglt2 I     Change to 670 G with u 100 when finances allow          Lipid Management     Lab Results   Component Value Date    CHOL 167 01/08/2019    CHOL 155 03/21/2018    CHOL 152 09/11/2017    CHLPL 178 10/28/2016    CHLPL 147 06/20/2016    CHLPL 159 03/17/2016     Lab Results   Component Value Date    TRIG 136 01/08/2019    TRIG 143 03/21/2018    TRIG 96 09/11/2017     Lab Results   Component Value Date    HDL 35 (L) 01/08/2019    HDL 35 (L) 03/21/2018    HDL 34 (L)  09/11/2017     Lab Results   Component Value Date     01/08/2019    LDL 87 03/21/2018    LDL 85 09/11/2017          Lipitor 40 mg tablet      continue     Lipids at goal     Blood Pressure Management:             cozaar 100 mg daily      metoprolol succinate ER 25 mg tablet,extended release 24 hr, 1 tab daily taking   --     no hctz since Acute on chronic kidney disease     Please refer to nephrology   Microvascular Complication Monitoring:  No Microalbuminuria,  nephropathy ckd stage III, stable      no neuropathy     ==     had vitreous hemorrhage, stop aspirin   Immunizations:  Last influenza immunization 2015, Last pneumococcal immunization has had pneumovax before age 65  Preventive Care:  Patient is not smoking  Weight Related:  Obesity, Counseled on nutrition, Counseled on physical activity  Sleep apnea positive     she will have cpap titration     should use compression stockings     --  start contrave - not effective     exercising and limiting carbs but still not able to lose weight     start saxenda - not covered                      [facility]      Lab Results   Component Value Date    [id number] 37.2 01/24/2020    [id number] 46.5 03/19/2019    [id number] 58.7 01/08/2019       Other Diabetes Related Aspects  Hashimoto's - no since tpo neg  MNG with subcm nodules     US personally reviewed , stable from May 2013 to Nov 2016     Lab Results   Component Value Date    TSH 1.880 03/21/2018     Lab Results   Component Value Date    [id number] 428 01/08/2019          5-14 no celiac disease          4. Follow-up: No follow-ups on file.          I spent 28  minutes reviewing patient electronic chart , reviewing medications , past history , active problems.   I provided advice regarding management of medical conditions, refilled prescriptions , ordered labs and arranged for future appointment.   Patient was advised to contact us if there were any unanswered questions or ongoing concerns.

## 2020-04-16 PROBLEM — N18.30 TYPE 1 DIABETES MELLITUS WITH STAGE 3 CHRONIC KIDNEY DISEASE (HCC): Status: ACTIVE | Noted: 2018-04-20

## 2020-04-16 PROBLEM — E10.22 TYPE 1 DIABETES MELLITUS WITH STAGE 3 CHRONIC KIDNEY DISEASE (HCC): Status: ACTIVE | Noted: 2018-04-20

## 2020-06-02 RX ORDER — FLASH GLUCOSE SENSOR
KIT MISCELLANEOUS
Qty: 2 EACH | Refills: 10 | Status: SHIPPED | OUTPATIENT
Start: 2020-06-02 | End: 2021-04-12

## 2020-06-16 ENCOUNTER — OFFICE VISIT (OUTPATIENT)
Dept: ENDOCRINOLOGY | Facility: CLINIC | Age: 50
End: 2020-06-16

## 2020-06-16 VITALS
HEIGHT: 64 IN | WEIGHT: 271.3 LBS | DIASTOLIC BLOOD PRESSURE: 70 MMHG | BODY MASS INDEX: 46.32 KG/M2 | OXYGEN SATURATION: 100 % | SYSTOLIC BLOOD PRESSURE: 100 MMHG | HEART RATE: 72 BPM

## 2020-06-16 DIAGNOSIS — E11.59 HYPERTENSION ASSOCIATED WITH DIABETES (HCC): ICD-10-CM

## 2020-06-16 DIAGNOSIS — E55.9 VITAMIN D DEFICIENCY: ICD-10-CM

## 2020-06-16 DIAGNOSIS — E78.2 MIXED DIABETIC HYPERLIPIDEMIA ASSOCIATED WITH TYPE 1 DIABETES MELLITUS (HCC): ICD-10-CM

## 2020-06-16 DIAGNOSIS — E53.8 B12 DEFICIENCY: ICD-10-CM

## 2020-06-16 DIAGNOSIS — E10.69 MIXED DIABETIC HYPERLIPIDEMIA ASSOCIATED WITH TYPE 1 DIABETES MELLITUS (HCC): ICD-10-CM

## 2020-06-16 DIAGNOSIS — E10.8 TYPE 1 DIABETES MELLITUS WITH COMPLICATION (HCC): Primary | ICD-10-CM

## 2020-06-16 DIAGNOSIS — N18.30 CHRONIC KIDNEY DISEASE (CKD), STAGE III (MODERATE) (HCC): ICD-10-CM

## 2020-06-16 DIAGNOSIS — I15.2 HYPERTENSION ASSOCIATED WITH DIABETES (HCC): ICD-10-CM

## 2020-06-16 PROCEDURE — 99214 OFFICE O/P EST MOD 30 MIN: CPT | Performed by: INTERNAL MEDICINE

## 2020-06-16 PROCEDURE — 95251 CONT GLUC MNTR ANALYSIS I&R: CPT | Performed by: INTERNAL MEDICINE

## 2020-06-16 NOTE — PROGRESS NOTES
Subjective    Jazmine Rodriguez is a 50 y.o. female. she is here today for follow-up.    Diabetes   Pertinent negatives for hypoglycemia include no confusion, dizziness, headaches, nervousness/anxiousness, pallor, seizures, speech difficulty or tremors. Pertinent negatives for diabetes include no chest pain, no fatigue, no polydipsia, no polyphagia, no polyuria and no weakness.              History of Present Illness     Diabetes type 1    Duration since 10 y   Timing constant  Quality uncontrolled  Severity high - diabetic retinopathy and ckd  Alleviating Factors - Insulin, through insulin pump   Associated symptoms - fatigue      Ho  vitreous hemorrhage related in part to aspirin, stable ckd     Uses u500 insulin     ---    Thyroid Nodule    Duration - diagnosed 40s  Timing - constant  Quality -    controlled        Previous Imaging -  last US from 2016 right sup 1.1. cm thyroid nodule , stable when compared to 2013     TSH - Normal      She refers Hashimoto's , TPO ab neg . She is euthyroid.                The following portions of the patient's history were reviewed and updated as appropriate:   Past Medical History:   Diagnosis Date   • Allergic rhinitis    • Astigmatism    • Borderline glaucoma    • Diabetic retinopathy (CMS/HCC)     prob PDR OD   • Dyslipidemia    • Essential hypertension    • GERD (gastroesophageal reflux disease)    • Nonproliferative diabetic retinopathy (CMS/HCC)     possible occult PDR OD      • Puncture wound without foreign body, right lower leg, initial encounter    • Sinus headache    • Type 1 diabetes mellitus (CMS/HCC)    • Type 1 diabetes mellitus with stage 3 chronic kidney disease (CMS/HCC) 2018   • Vitreous hemorrhage (CMS/HCC)     s/p vitrectomy OD, doing well        Past Surgical History:   Procedure Laterality Date   • BLADDER SURGERY Bilateral 1973    Bilateral refulx, with progressive deterioration of upper tracts.   •  SECTION  2009     Emergent primary low transverse  section. Intrauterine pregnancy. Chronic hypertension. Superimposed preecalmpsia. Insulin dependent diabetes.   • CYSTOSCOPY  1972    And internal urethrotomy. Bilateral reflux with Walker's membrane.   • INJECTION OF MEDICATION  2016    Kenalog (2)     • INJECTION OF MEDICATION  10/23/2015    Toradol (1)    • PAP SMEAR  2009    Negative     Family History   Problem Relation Age of Onset   • Cancer Paternal Grandfather         Colorectal Cancer   • Diabetes Other    • Hypertension Other    • Thyroid disease Other    • Breast cancer Mother    • Breast cancer Paternal Aunt      OB History        1    Para   1    Term   1            AB        Living           SAB        TAB        Ectopic        Molar        Multiple        Live Births                  Current Outpatient Medications   Medication Sig Dispense Refill   • atorvastatin (LIPITOR) 40 MG tablet TAKE 1 TABLET BY MOUTH  DAILY 90 tablet 0   • Continuous Blood Gluc  (FREESTYLE YIFAN 14 DAY READER) device 1 each Daily. Use as indicated 1 Device 1   • Continuous Blood Gluc Sensor (FREESTYLE YIFAN 14 DAY SENSOR) [facility] USE AS DIRECTED. CHANGE EVERY 14 DAYS 2 each 10   • insulin regular (HUMULIN R) 500 UNIT/ML CONCENTRATED injection Inject 1 to 1.3 ml via pump daily 40 mL 2   • levonorgestrel (MIRENA) 20 MCG/24HR IUD 1 each by Intrauterine route 1 (One) Time.     • losartan (COZAAR) 100 MG tablet Take 1 tablet by mouth Daily. 30 tablet 0   • metoprolol succinate XL (TOPROL-XL) 25 MG 24 hr tablet TAKE 1 TABLET BY MOUTH  DAILY 90 tablet 0   • ONE TOUCH ULTRA TEST test strip TEST 4 TIMES DAILY 400 each 3   • promethazine-dextromethorphan (PROMETHAZINE-DM) 6.25-15 MG/5ML syrup Take one teaspoon qhs prn cough 120 mL 0     No current facility-administered medications for this visit.      Allergies   Allergen Reactions   • Sulfa Antibiotics Shortness Of Breath     Social History     Socioeconomic  "History   • Marital status:      Spouse name: Not on file   • Number of children: Not on file   • Years of education: Not on file   • Highest education level: Not on file   Tobacco Use   • Smoking status: Never Smoker   • Smokeless tobacco: Never Used       Review of Systems  Review of Systems   Constitutional: Negative for activity change, appetite change, diaphoresis and fatigue.   HENT: Negative for congestion, dental problem, drooling, facial swelling, sneezing, sore throat, tinnitus, trouble swallowing and voice change.    Eyes: Negative for photophobia, pain, discharge, redness, itching and visual disturbance.   Respiratory: Negative for apnea, cough, choking, chest tightness and shortness of breath.    Cardiovascular: Negative for chest pain, palpitations and leg swelling.   Gastrointestinal: Negative for abdominal distention, abdominal pain, constipation, diarrhea, nausea and vomiting.   Endocrine: Negative for cold intolerance, heat intolerance, polydipsia, polyphagia and polyuria.   Genitourinary: Negative for difficulty urinating, dysuria, frequency, hematuria and urgency.   Musculoskeletal: Negative for arthralgias, back pain, gait problem, joint swelling, myalgias, neck pain and neck stiffness.   Skin: Negative for color change, pallor, rash and wound.   Allergic/Immunologic: Negative for environmental allergies, food allergies and immunocompromised state.   Neurological: Negative for dizziness, tremors, seizures, facial asymmetry, speech difficulty, weakness, light-headedness, numbness and headaches.   Hematological: Negative for adenopathy. Does not bruise/bleed easily.   Psychiatric/Behavioral: Negative for agitation, behavioral problems, confusion and sleep disturbance. The patient is not nervous/anxious.         Objective    /70 (BP Location: Right arm, Patient Position: Sitting, Cuff Size: Large Adult)   Pulse 72   Ht 162.6 cm (64\")   Wt 123 kg (271 lb 4.8 oz)   SpO2 100%   BMI " 46.57 kg/m²   Physical Exam   Constitutional: She is oriented to person, place, and time. She appears well-developed and well-nourished. No distress.   HENT:   Head: Normocephalic and atraumatic.   Right Ear: External ear normal.   Left Ear: External ear normal.   Nose: Nose normal.   Eyes: Pupils are equal, round, and reactive to light. Conjunctivae and EOM are normal. Right eye exhibits no discharge. Left eye exhibits no discharge. No scleral icterus.   Neck: Normal range of motion. Neck supple. No tracheal deviation present.   Pulmonary/Chest: Effort normal. No respiratory distress. She has no wheezes.   Musculoskeletal: Normal range of motion. She exhibits no edema or deformity.   Neurological: She is alert and oriented to person, place, and time. No cranial nerve deficit.   Skin: Skin is warm and dry. No rash noted. She is not diaphoretic. No erythema. No pallor.   Psychiatric: She has a normal mood and affect. Her behavior is normal. Judgment and thought content normal.       Lab Review  Glucose (mg/dL)   Date Value   01/24/2020 240 (H)   06/18/2019 201 (H)   03/19/2019 236 (H)     Sodium (mmol/L)   Date Value   01/24/2020 133 (L)   06/18/2019 137   03/19/2019 134 (L)     Potassium (mmol/L)   Date Value   01/24/2020 4.7   06/18/2019 4.4   03/19/2019 4.1     Chloride (mmol/L)   Date Value   01/24/2020 95 (L)   06/18/2019 100   03/19/2019 99     CO2 (mmol/L)   Date Value   01/24/2020 23.3   06/18/2019 26.6   03/19/2019 25.0     BUN (mg/dL)   Date Value   01/24/2020 17   06/18/2019 15   03/19/2019 15     Creatinine (mg/dL)   Date Value   01/24/2020 1.25 (H)   06/18/2019 1.17 (H)   03/19/2019 1.11 (H)     Hemoglobin A1C (%)   Date Value   01/08/2019 9.6 (H)   03/21/2018 9.4 (H)   09/11/2017 8.6 (H)     Triglycerides (mg/dL)   Date Value   01/08/2019 136   03/21/2018 143   09/11/2017 96     LDL Cholesterol  (mg/dL)   Date Value   01/08/2019 107   03/21/2018 87   09/11/2017 85       Assessment/Plan      1. Type 1  diabetes mellitus with complication (CMS/[facility])    2. Hypertension associated with diabetes (CMS/[facility])    3. Mixed diabetic hyperlipidemia associated with type 1 diabetes mellitus (CMS/[facility])    4. Vitamin D deficiency    5. B12 deficiency    6. Chronic kidney disease (CKD), stage III (moderate) (CMS/[facility])    .    Medications prescribed:  Outpatient Encounter Medications as of 6/16/2020   Medication Sig Dispense Refill   • atorvastatin (LIPITOR) 40 MG tablet TAKE 1 TABLET BY MOUTH  DAILY 90 tablet 0   • Continuous Blood Gluc  (FREESTYLE YIFAN 14 DAY READER) device 1 each Daily. Use as indicated 1 Device 1   • Continuous Blood Gluc Sensor (FREESTYLE YIFAN 14 DAY SENSOR) [facility] USE AS DIRECTED. CHANGE EVERY 14 DAYS 2 each 10   • insulin regular (HUMULIN R) 500 UNIT/ML CONCENTRATED injection Inject 1 to 1.3 ml via pump daily 40 mL 2   • levonorgestrel (MIRENA) 20 MCG/24HR IUD 1 each by Intrauterine route 1 (One) Time.     • losartan (COZAAR) 100 MG tablet Take 1 tablet by mouth Daily. 30 tablet 0   • metoprolol succinate XL (TOPROL-XL) 25 MG 24 hr tablet TAKE 1 TABLET BY MOUTH  DAILY 90 tablet 0   • ONE TOUCH ULTRA TEST test strip TEST 4 TIMES DAILY 400 each 3   • promethazine-dextromethorphan (PROMETHAZINE-DM) 6.25-15 MG/5ML syrup Take one teaspoon qhs prn cough 120 mL 0     No facility-administered encounter medications on file as of 6/16/2020.        Orders placed during this encounter include:  No orders of the defined types were placed in this encounter.      Glycemic management   Yifan 2 weeks reviewed                    Lab Results   Component Value Date    HGBA1C 9.6 (H) 01/08/2019    HGBA1C 9.4 (H) 03/21/2018    HGBA1C 8.6 (H) 09/11/2017     Lab Results   Component Value Date    MICROALBUR <1.2 01/24/2020    CREATININE 1.25 (H) 01/24/2020                 She needs to be good at guesstimating U500 with carb intake     pump with  U 500 insulin      Basal insulin      MN 0.375 0.425-0.475  6-4 am 0.375 - 0.575 -  0.625 0.675 -0.725  8 am 0.525  Noon 0.275 -- 0.225  6 pm 0.375 0.4    Carb ratio 9, correction 70 - turn wizard off    Set doses 13 for bkfast and 8 for supper working      she has ARLENE and CPAP may help to decrease overnight BG rise     victoza resulted in profound hypoglycemia      No sglt2 I     Change to 670 G with u 100 when finances allow     Can do keto but monitor and tolerate mild ketosis only      Lipid Management     Lab Results   Component Value Date    CHOL 167 01/08/2019    CHOL 155 03/21/2018    CHOL 152 09/11/2017    CHLPL 178 10/28/2016    CHLPL 147 06/20/2016    CHLPL 159 03/17/2016     Lab Results   Component Value Date    TRIG 136 01/08/2019    TRIG 143 03/21/2018    TRIG 96 09/11/2017     Lab Results   Component Value Date    HDL 35 (L) 01/08/2019    HDL 35 (L) 03/21/2018    HDL 34 (L) 09/11/2017     Lab Results   Component Value Date     01/08/2019    LDL 87 03/21/2018    LDL 85 09/11/2017          Lipitor 40 mg tablet      continue     Lipids at goal     Blood Pressure Management:             cozaar 100 mg daily      metoprolol succinate ER 25 mg tablet,extended release 24 hr, 1 tab daily taking   --     no hctz since Acute on chronic kidney disease     Please refer to nephrology   Microvascular Complication Monitoring:  No Microalbuminuria,  nephropathy ckd stage III, stable      no neuropathy     ==     had vitreous hemorrhage, stop aspirin   Immunizations:  Last influenza immunization 2015, Last pneumococcal immunization has had pneumovax before age 65  Preventive Care:  Patient is not smoking  Weight Related:  Obesity, Counseled on nutrition, Counseled on physical activity  Sleep apnea positive     she will have cpap titration     should use compression stockings     --  start contrave - not effective     exercising and limiting carbs but still not able to lose weight     start saxenda - not covered                      [facility]      Lab Results   Component Value Date    DWQO28CI  37.2 01/24/2020    [id number] 46.5 03/19/2019    [id number] 58.7 01/08/2019       Other Diabetes Related Aspects  Hashimoto's - no since tpo neg  MNG with subcm nodules     US personally reviewed , stable from May 2013 to Nov 2016     Lab Results   Component Value Date    TSH 1.880 03/21/2018     Lab Results   Component Value Date    [id number] 428 01/08/2019          5-14 no celiac disease          4. Follow-up: No follow-ups on file.

## 2020-08-03 RX ORDER — FLASH GLUCOSE SCANNING READER
EACH MISCELLANEOUS
Qty: 1 DEVICE | Refills: 0 | Status: SHIPPED | OUTPATIENT
Start: 2020-08-03 | End: 2021-06-03

## 2020-08-04 RX ORDER — METOPROLOL SUCCINATE 25 MG/1
25 TABLET, EXTENDED RELEASE ORAL DAILY
Qty: 90 TABLET | Refills: 3 | Status: SHIPPED | OUTPATIENT
Start: 2020-08-04 | End: 2021-06-01

## 2020-08-24 ENCOUNTER — LAB (OUTPATIENT)
Dept: LAB | Facility: HOSPITAL | Age: 50
End: 2020-08-24

## 2020-08-24 LAB
25(OH)D3 SERPL-MCNC: 35.9 NG/ML (ref 30–100)
ALBUMIN SERPL-MCNC: 4.1 G/DL (ref 3.5–5.2)
ALBUMIN UR-MCNC: <1.2 MG/DL
ALBUMIN/GLOB SERPL: 1.6 G/DL
ALP SERPL-CCNC: 85 U/L (ref 39–117)
ALT SERPL W P-5'-P-CCNC: 36 U/L (ref 1–33)
ANION GAP SERPL CALCULATED.3IONS-SCNC: 10.6 MMOL/L (ref 5–15)
AST SERPL-CCNC: 19 U/L (ref 1–32)
BASOPHILS # BLD AUTO: 0.05 10*3/MM3 (ref 0–0.2)
BASOPHILS NFR BLD AUTO: 0.7 % (ref 0–1.5)
BILIRUB SERPL-MCNC: 0.7 MG/DL (ref 0–1.2)
BUN SERPL-MCNC: 16 MG/DL (ref 6–20)
BUN/CREAT SERPL: 14.3 (ref 7–25)
CALCIUM SPEC-SCNC: 9.2 MG/DL (ref 8.6–10.5)
CHLORIDE SERPL-SCNC: 104 MMOL/L (ref 98–107)
CHOLEST SERPL-MCNC: 162 MG/DL (ref 0–200)
CO2 SERPL-SCNC: 22.4 MMOL/L (ref 22–29)
CREAT SERPL-MCNC: 1.12 MG/DL (ref 0.57–1)
CREAT UR-MCNC: 116.2 MG/DL
DEPRECATED RDW RBC AUTO: 43.7 FL (ref 37–54)
EOSINOPHIL # BLD AUTO: 0.36 10*3/MM3 (ref 0–0.4)
EOSINOPHIL NFR BLD AUTO: 4.7 % (ref 0.3–6.2)
ERYTHROCYTE [DISTWIDTH] IN BLOOD BY AUTOMATED COUNT: 13.7 % (ref 12.3–15.4)
ERYTHROCYTE [SEDIMENTATION RATE] IN BLOOD: 24 MM/HR (ref 0–20)
GFR SERPL CREATININE-BSD FRML MDRD: 51 ML/MIN/1.73
GLOBULIN UR ELPH-MCNC: 2.5 GM/DL
GLUCOSE SERPL-MCNC: 218 MG/DL (ref 65–99)
HBA1C MFR BLD: 8.6 % (ref 4.8–5.6)
HCT VFR BLD AUTO: 33.4 % (ref 34–46.6)
HDLC SERPL-MCNC: 32 MG/DL (ref 40–60)
HGB BLD-MCNC: 11.4 G/DL (ref 12–15.9)
IMM GRANULOCYTES # BLD AUTO: 0.04 10*3/MM3 (ref 0–0.05)
IMM GRANULOCYTES NFR BLD AUTO: 0.5 % (ref 0–0.5)
LDLC SERPL CALC-MCNC: 85 MG/DL (ref 0–100)
LDLC/HDLC SERPL: 2.67 {RATIO}
LYMPHOCYTES # BLD AUTO: 1.93 10*3/MM3 (ref 0.7–3.1)
LYMPHOCYTES NFR BLD AUTO: 25.3 % (ref 19.6–45.3)
MCH RBC QN AUTO: 30 PG (ref 26.6–33)
MCHC RBC AUTO-ENTMCNC: 34.1 G/DL (ref 31.5–35.7)
MCV RBC AUTO: 87.9 FL (ref 79–97)
MICROALBUMIN/CREAT UR: NORMAL MG/G{CREAT}
MONOCYTES # BLD AUTO: 0.58 10*3/MM3 (ref 0.1–0.9)
MONOCYTES NFR BLD AUTO: 7.6 % (ref 5–12)
NEUTROPHILS NFR BLD AUTO: 4.67 10*3/MM3 (ref 1.7–7)
NEUTROPHILS NFR BLD AUTO: 61.2 % (ref 42.7–76)
NRBC BLD AUTO-RTO: 0 /100 WBC (ref 0–0.2)
PHOSPHATE SERPL-MCNC: 3.7 MG/DL (ref 2.5–4.5)
PLATELET # BLD AUTO: 204 10*3/MM3 (ref 140–450)
PMV BLD AUTO: 10.8 FL (ref 6–12)
POTASSIUM SERPL-SCNC: 4.5 MMOL/L (ref 3.5–5.2)
PROT SERPL-MCNC: 6.6 G/DL (ref 6–8.5)
RBC # BLD AUTO: 3.8 10*6/MM3 (ref 3.77–5.28)
SODIUM SERPL-SCNC: 137 MMOL/L (ref 136–145)
TRIGL SERPL-MCNC: 223 MG/DL (ref 0–150)
TSH SERPL DL<=0.05 MIU/L-ACNC: 2.4 UIU/ML (ref 0.27–4.2)
VIT B12 BLD-MCNC: 327 PG/ML (ref 211–946)
VLDLC SERPL-MCNC: 44.6 MG/DL (ref 5–40)
WBC # BLD AUTO: 7.63 10*3/MM3 (ref 3.4–10.8)

## 2020-08-24 PROCEDURE — 85025 COMPLETE CBC W/AUTO DIFF WBC: CPT | Performed by: INTERNAL MEDICINE

## 2020-08-24 PROCEDURE — 84100 ASSAY OF PHOSPHORUS: CPT | Performed by: INTERNAL MEDICINE

## 2020-08-24 PROCEDURE — 82043 UR ALBUMIN QUANTITATIVE: CPT | Performed by: INTERNAL MEDICINE

## 2020-08-24 PROCEDURE — 80053 COMPREHEN METABOLIC PANEL: CPT | Performed by: INTERNAL MEDICINE

## 2020-08-24 PROCEDURE — 84443 ASSAY THYROID STIM HORMONE: CPT | Performed by: INTERNAL MEDICINE

## 2020-08-24 PROCEDURE — 82306 VITAMIN D 25 HYDROXY: CPT | Performed by: INTERNAL MEDICINE

## 2020-08-24 PROCEDURE — 80061 LIPID PANEL: CPT | Performed by: INTERNAL MEDICINE

## 2020-08-24 PROCEDURE — 82570 ASSAY OF URINE CREATININE: CPT | Performed by: INTERNAL MEDICINE

## 2020-08-24 PROCEDURE — 83036 HEMOGLOBIN GLYCOSYLATED A1C: CPT | Performed by: INTERNAL MEDICINE

## 2020-08-24 PROCEDURE — 36415 COLL VENOUS BLD VENIPUNCTURE: CPT | Performed by: INTERNAL MEDICINE

## 2020-08-24 PROCEDURE — 82607 VITAMIN B-12: CPT | Performed by: INTERNAL MEDICINE

## 2020-08-24 PROCEDURE — 85652 RBC SED RATE AUTOMATED: CPT | Performed by: INTERNAL MEDICINE

## 2020-10-01 ENCOUNTER — TELEPHONE (OUTPATIENT)
Dept: ENDOCRINOLOGY | Facility: CLINIC | Age: 50
End: 2020-10-01

## 2020-10-01 RX ORDER — ATORVASTATIN CALCIUM 40 MG/1
40 TABLET, FILM COATED ORAL DAILY
Qty: 90 TABLET | Refills: 3 | Status: SHIPPED | OUTPATIENT
Start: 2020-10-01 | End: 2021-06-03 | Stop reason: SDUPTHER

## 2020-10-01 RX ORDER — ATORVASTATIN CALCIUM 40 MG/1
40 TABLET, FILM COATED ORAL DAILY
Qty: 90 TABLET | Refills: 3 | Status: SHIPPED | OUTPATIENT
Start: 2020-10-01 | End: 2020-10-01 | Stop reason: SDUPTHER

## 2020-10-01 RX ORDER — LOSARTAN POTASSIUM 100 MG/1
100 TABLET ORAL DAILY
Qty: 90 TABLET | Refills: 3 | Status: SHIPPED | OUTPATIENT
Start: 2020-10-01 | End: 2021-11-15

## 2020-10-01 RX ORDER — LOSARTAN POTASSIUM 100 MG/1
100 TABLET ORAL DAILY
Qty: 90 TABLET | Refills: 3 | Status: SHIPPED | OUTPATIENT
Start: 2020-10-01 | End: 2020-10-01 | Stop reason: SDUPTHER

## 2020-10-01 NOTE — TELEPHONE ENCOUNTER
Pt called stating that she is needing refills on:    losartan (COZAAR) 100 MG tablet  atorvastatin (LIPITOR) 40 MG tablet     Sent to:     [facility] - [place], [place] - [address] [phone] [facility] [phone]

## 2020-10-16 PROCEDURE — U0002 COVID-19 LAB TEST NON-CDC: HCPCS | Performed by: FAMILY MEDICINE

## 2020-12-02 ENCOUNTER — TELEMEDICINE (OUTPATIENT)
Dept: ENDOCRINOLOGY | Facility: CLINIC | Age: 50
End: 2020-12-02

## 2020-12-02 DIAGNOSIS — I15.2 HYPERTENSION ASSOCIATED WITH DIABETES (HCC): ICD-10-CM

## 2020-12-02 DIAGNOSIS — E11.59 HYPERTENSION ASSOCIATED WITH DIABETES (HCC): ICD-10-CM

## 2020-12-02 DIAGNOSIS — E53.8 B12 DEFICIENCY: ICD-10-CM

## 2020-12-02 DIAGNOSIS — E55.9 VITAMIN D DEFICIENCY: ICD-10-CM

## 2020-12-02 DIAGNOSIS — E10.69 MIXED DIABETIC HYPERLIPIDEMIA ASSOCIATED WITH TYPE 1 DIABETES MELLITUS (HCC): ICD-10-CM

## 2020-12-02 DIAGNOSIS — E78.2 MIXED DIABETIC HYPERLIPIDEMIA ASSOCIATED WITH TYPE 1 DIABETES MELLITUS (HCC): ICD-10-CM

## 2020-12-02 DIAGNOSIS — E65 LIPOHYPERTROPHY: ICD-10-CM

## 2020-12-02 DIAGNOSIS — E10.8 TYPE 1 DIABETES MELLITUS WITH COMPLICATION (HCC): Primary | ICD-10-CM

## 2020-12-02 PROCEDURE — 99214 OFFICE O/P EST MOD 30 MIN: CPT | Performed by: INTERNAL MEDICINE

## 2020-12-03 NOTE — PROGRESS NOTES
Subjective    Jazmine Rodriguez is a 50 y.o. female. she is here today for follow-up.    Diabetes  Pertinent negatives for hypoglycemia include no confusion, dizziness, headaches, nervousness/anxiousness, pallor, seizures, speech difficulty or tremors. Pertinent negatives for diabetes include no chest pain, no fatigue, no polydipsia, no polyphagia, no polyuria and no weakness.                                          This was a Telehealth Encounter. Benefits and Disadvantages of a Telehealth Visit were discussed and accepted by patient. .  Patient agreed to receive service through Telehealth visit as patient is being compliant with social distancing recommendations imparted by CDC.     You have chosen to receive care through a telehealth visit.  Do you consent to use a video/audio connection for your medical care today? Yes          History of Present Illness     Diabetes type 1    Duration since 10 y   Timing constant  Quality uncontrolled  Severity high - diabetic retinopathy and ckd  Alleviating Factors - Insulin, through insulin pump   Associated symptoms - fatigue      Ho  vitreous hemorrhage related in part to aspirin, stable ckd     Uses u500 insulin     ---    Thyroid Nodule    Duration - diagnosed 40s  Timing - constant  Quality -    controlled        Previous Imaging -  last US from Nov 2016 right sup 1.1. cm thyroid nodule , stable when compared to 2013     TSH - Normal      She refers Hashimoto's , TPO ab neg . She is euthyroid.                The following portions of the patient's history were reviewed and updated as appropriate:   Past Medical History:   Diagnosis Date   • Allergic rhinitis    • Astigmatism    • Borderline glaucoma    • Diabetic retinopathy (CMS/HCC)     prob PDR OD   • Dyslipidemia    • Essential hypertension    • GERD (gastroesophageal reflux disease)    • Nonproliferative diabetic retinopathy (CMS/HCC)     possible occult PDR OD      • Puncture wound without foreign body, right  lower leg, initial encounter    • Sinus headache    • Type 1 diabetes mellitus (CMS/[facility])    • Type 1 diabetes mellitus with stage 3 chronic kidney disease (CMS/[facility]) 2018   • Vitreous hemorrhage (CMS/[facility])     s/p vitrectomy OD, doing well        Past Surgical History:   Procedure Laterality Date   • BLADDER SURGERY Bilateral 1973    Bilateral refulx, with progressive deterioration of upper tracts.   •  SECTION  2009    Emergent primary low transverse  section. Intrauterine pregnancy. Chronic hypertension. Superimposed preecalmpsia. Insulin dependent diabetes.   • CYSTOSCOPY  1972    And internal urethrotomy. Bilateral reflux with Walker's membrane.   • INJECTION OF MEDICATION  2016    Kenalog (2)     • INJECTION OF MEDICATION  10/23/2015    Toradol (1)    • PAP SMEAR  2009    Negative     Family History   Problem Relation Age of Onset   • Cancer Paternal Grandfather         Colorectal Cancer   • Diabetes Other    • Hypertension Other    • Thyroid disease Other    • Breast cancer Mother    • Breast cancer Paternal Aunt      OB History        1    Para   1    Term   1            AB        Living           SAB        TAB        Ectopic        Molar        Multiple        Live Births                  Current Outpatient Medications   Medication Sig Dispense Refill   • Acetone, Urine, Test (TRUEPLUS KETONE TEST STRIPS) 1 each by Bladder  route 3 (Three) Times a Day. Every 8 hours of fasting check ketones 90 each 3   • atorvastatin (LIPITOR) 40 MG tablet Take 1 tablet by mouth Daily. 90 tablet 3   • azithromycin (ZITHROMAX) 250 MG tablet Take 2 tablets the first day, then 1 tablet daily for 4 days. 6 tablet 0   • Continuous Blood Gluc  (FREESTYLE YIFAN 14 DAY READER) device USE AS DIRECTED DAILY 1 Device 0   • Continuous Blood Gluc Sensor (FREESTYLE YIFAN 14 DAY SENSOR) [facility] [place] USE AS DIRECTED. CHANGE EVERY 14 DAYS 2 each 10   • insulin regular (HUMULIN R)  500 UNIT/ML CONCENTRATED injection Inject 1 to 1.3 ml via pump daily 40 mL 2   • Insulin Regular Human 4 & 8 & 12 units powder Inhale 4-32 Units 3 (Three) Times a Day With Meals. 4-64 units TID , max dose 192 units daily 720 each 11   • levonorgestrel (MIRENA) 20 MCG/24HR IUD 1 each by Intrauterine route 1 (One) Time.     • losartan (COZAAR) 100 MG tablet Take 1 tablet by mouth Daily. 90 tablet 3   • metoprolol succinate XL (TOPROL-XL) 25 MG 24 hr tablet Take 1 tablet by mouth Daily. 90 tablet 3   • ONE TOUCH ULTRA TEST test strip TEST 4 TIMES DAILY 400 each 3   • promethazine-dextromethorphan (PROMETHAZINE-DM) 6.25-15 MG/5ML syrup Take one teaspoon qhs prn cough 120 mL 0     No current facility-administered medications for this visit.      Allergies   Allergen Reactions   • Sulfa Antibiotics Shortness Of Breath     Social History     Socioeconomic History   • Marital status:      Spouse name: Not on file   • Number of children: Not on file   • Years of education: Not on file   • Highest education level: Not on file   Tobacco Use   • Smoking status: Never Smoker   • Smokeless tobacco: Never Used       Review of Systems  Review of Systems   Constitutional: Negative for activity change, appetite change, diaphoresis and fatigue.   HENT: Negative for congestion, dental problem, drooling, facial swelling, sneezing, sore throat, tinnitus, trouble swallowing and voice change.    Eyes: Negative for photophobia, pain, discharge, redness, itching and visual disturbance.   Respiratory: Negative for apnea, cough, choking, chest tightness and shortness of breath.    Cardiovascular: Negative for chest pain, palpitations and leg swelling.   Gastrointestinal: Negative for abdominal distention, abdominal pain, constipation, diarrhea, nausea and vomiting.   Endocrine: Negative for cold intolerance, heat intolerance, polydipsia, polyphagia and polyuria.   Genitourinary: Negative for difficulty urinating, dysuria, frequency,  hematuria and urgency.   Musculoskeletal: Negative for arthralgias, back pain, gait problem, joint swelling, myalgias, neck pain and neck stiffness.   Skin: Negative for color change, pallor, rash and wound.   Allergic/Immunologic: Negative for environmental allergies, food allergies and immunocompromised state.   Neurological: Negative for dizziness, tremors, seizures, facial asymmetry, speech difficulty, weakness, light-headedness, numbness and headaches.   Hematological: Negative for adenopathy. Does not bruise/bleed easily.   Psychiatric/Behavioral: Negative for agitation, behavioral problems, confusion and sleep disturbance. The patient is not nervous/anxious.         Objective    There were no vitals taken for this visit.  Physical Exam   Constitutional: She appears well-developed and well-nourished. No distress.   HENT:   Head: Normocephalic.   Right Ear: External ear normal.   Left Ear: External ear normal.   Nose: Nose normal.   Mouth/Throat: Oropharynx is clear and moist. No oropharyngeal exudate.   Eyes: Pupils are equal, round, and reactive to light. Conjunctivae and EOM are normal. Right eye exhibits no discharge. Left eye exhibits no discharge. No scleral icterus.   Neck: Neck normal appearance.No JVD present. No tracheal deviation present. No thyromegaly present.   Cardiovascular:   No conjunctival pallor noted.    Pulmonary/Chest: Effort normal. No stridor.  No respiratory distress. She no audible wheeze...She exhibits no tenderness.   Abdominal: Abdomen appears normal. Soft. She exhibits no distension and no visible mass. There is no abdominal tenderness. No visible hernia present.   Musculoskeletal: Normal range of motion.         General: No tenderness, deformity, edema or no effusion.   Lymphadenopathy:     She has no cervical adenopathy.   Neurological: She is alert. No cranial nerve deficit. Coordination normal.   Skin: No rash noted. She is not diaphoretic. No nail bed cyanosis or erythema. No  pallor. Nails show no clubbing.   Psychiatric: She mood appears normal. Her affect is normal. Her behavior is normal. Thought content is normal. She does not express abnormal judgement.     Has lipohypertrophy in abdomen   Lab Review  Glucose (mg/dL)   Date Value   08/24/2020 218 (H)   01/24/2020 240 (H)   06/18/2019 201 (H)     Sodium (mmol/L)   Date Value   08/24/2020 137   01/24/2020 133 (L)   06/18/2019 137     Potassium (mmol/L)   Date Value   08/24/2020 4.5   01/24/2020 4.7   06/18/2019 4.4     Chloride (mmol/L)   Date Value   08/24/2020 104   01/24/2020 95 (L)   06/18/2019 100     CO2 (mmol/L)   Date Value   08/24/2020 22.4   01/24/2020 23.3   06/18/2019 26.6     BUN (mg/dL)   Date Value   08/24/2020 16   01/24/2020 17   06/18/2019 15     Creatinine (mg/dL)   Date Value   08/24/2020 1.12 (H)   01/24/2020 1.25 (H)   06/18/2019 1.17 (H)     Hemoglobin A1C (%)   Date Value   08/24/2020 8.60 (H)   01/08/2019 9.6 (H)   03/21/2018 9.4 (H)   09/11/2017 8.6 (H)     Triglycerides (mg/dL)   Date Value   08/24/2020 223 (H)   01/08/2019 136   03/21/2018 143     LDL Cholesterol  (mg/dL)   Date Value   08/24/2020 85   01/08/2019 107   03/21/2018 87   09/11/2017 85       Assessment/Plan      1. Type 1 diabetes mellitus with complication (CMS/[facility])    2. Hypertension associated with diabetes (CMS/[facility])    3. Mixed diabetic hyperlipidemia associated with type 1 diabetes mellitus (CMS/[facility])    4. Vitamin D deficiency    5. B12 deficiency    6. Lipohypertrophy    .    Medications prescribed:  Outpatient Encounter Medications as of 12/2/2020   Medication Sig Dispense Refill   • Acetone, Urine, Test (TRUEPLUS KETONE TEST STRIPS) 1 each by Bladder  route 3 (Three) Times a Day. Every 8 hours of fasting check ketones 90 each 3   • atorvastatin (LIPITOR) 40 MG tablet Take 1 tablet by mouth Daily. 90 tablet 3   • azithromycin (ZITHROMAX) 250 MG tablet Take 2 tablets the first day, then 1 tablet daily for 4 days. 6 tablet 0   • Continuous  Blood Gluc  (FREESTYLE YIAFN 14 DAY READER) device USE AS DIRECTED DAILY 1 Device 0   • Continuous Blood Gluc Sensor (FREESTYLE YIFAN 14 DAY SENSOR) [facility] USE AS DIRECTED. CHANGE EVERY 14 DAYS 2 each 10   • insulin regular (HUMULIN R) 500 UNIT/ML CONCENTRATED injection Inject 1 to 1.3 ml via pump daily 40 mL 2   • Insulin Regular Human 4 & 8 & 12 units powder Inhale 4-32 Units 3 (Three) Times a Day With Meals. 4-64 units TID , max dose 192 units daily 720 each 11   • levonorgestrel (MIRENA) 20 MCG/24HR IUD 1 each by Intrauterine route 1 (One) Time.     • losartan (COZAAR) 100 MG tablet Take 1 tablet by mouth Daily. 90 tablet 3   • metoprolol succinate XL (TOPROL-XL) 25 MG 24 hr tablet Take 1 tablet by mouth Daily. 90 tablet 3   • ONE TOUCH ULTRA TEST test strip TEST 4 TIMES DAILY 400 each 3   • promethazine-dextromethorphan (PROMETHAZINE-DM) 6.25-15 MG/5ML syrup Take one teaspoon qhs prn cough 120 mL 0     No facility-administered encounter medications on file as of 12/2/2020.        Orders placed during this encounter include:  No orders of the defined types were placed in this encounter.      Glycemic management   Yifan 2 weeks reviewed  Ppg rise                   Lab Results   Component Value Date    HGBA1C 8.60 (H) 08/24/2020    HGBA1C 9.6 (H) 01/08/2019    HGBA1C 9.4 (H) 03/21/2018     Lab Results   Component Value Date    MICROALBUR <1.2 08/24/2020    CREATININE 1.12 (H) 08/24/2020                 She needs to be good at guesstimating U500 with carb intake     pump with  U 500 insulin      Basal insulin      MN 0.375 0.425-0.475  6-4 am 0.375 - 0.575 - 0.625 0.675 -0.725  8 am 0.525  Noon 0.275 -- 0.225  6 pm 0.375 0.4    Change all to 0.85    Carb ratio 9, correction 70 - turn wizard off    Bolus afrezza 4 to 64 units w meals, max amount 192 since lipohypertrophy and minimal response to bolus insulin     Set doses 13 for bkfast and 8 for supper working      she has ARLENE and CPAP may help to decrease  overnight BG rise     victoza resulted in profound hypoglycemia      No sglt2 I     Change to 670 G with u 100 when finances allow     Can do keto but monitor and tolerate mild ketosis only      Lipid Management     Lab Results   Component Value Date    CHOL 162 08/24/2020    CHOL 167 01/08/2019    CHOL 155 03/21/2018    CHLPL 178 10/28/2016    CHLPL 147 06/20/2016    CHLPL 159 03/17/2016     Lab Results   Component Value Date    TRIG 223 (H) 08/24/2020    TRIG 136 01/08/2019    TRIG 143 03/21/2018     Lab Results   Component Value Date    HDL 32 (L) 08/24/2020    HDL 35 (L) 01/08/2019    HDL 35 (L) 03/21/2018     Lab Results   Component Value Date    LDL 85 08/24/2020     01/08/2019    LDL 87 03/21/2018          Lipitor 40 mg tablet      continue     Lipids at goal     Blood Pressure Management:             cozaar 100 mg daily      metoprolol succinate ER 25 mg tablet,extended release 24 hr, 1 tab daily taking   --     no hctz since Acute on chronic kidney disease     Please refer to nephrology   Microvascular Complication Monitoring:  No Microalbuminuria,  nephropathy ckd stage III, stable      no neuropathy     ==     had vitreous hemorrhage, stop aspirin   Immunizations:  Last influenza immunization 2015, Last pneumococcal immunization has had pneumovax before age 65  Preventive Care:  Patient is not smoking  Weight Related:  Obesity, Counseled on nutrition, Counseled on physical activity  Sleep apnea positive     she will have cpap titration     should use compression stockings     --  start contrave - not effective     exercising and limiting carbs but still not able to lose weight     start saxenda - not covered                      [facility]      Lab Results   Component Value Date    QAIW03AS 35.9 08/24/2020    OIDU52ZN 37.2 01/24/2020    INLL66NM 46.5 03/19/2019       Other Diabetes Related Aspects  Hashimoto's - no since tpo neg  MNG with subcm nodules     US personally reviewed , stable from May   to 2016     Lab Results   Component Value Date    TSH 2.400 2020     Lab Results   Component Value Date    [id number] 327 2020          5-14 no celiac disease          4. Follow-up: No follow-ups on file.    I spent 15 minutes reviewing patient electronic chart , reviewing medications , past history , active problems.   I provided advice regarding management of medical conditions, refilled prescriptions , ordered labs and arranged for future appointment.   Patient was advised to contact us if there were any unanswered questions or ongoing concerns.     Patient Demographics    Patient Name   Nirav Rodriguez Sex   Female    1970 SSN    Address   [address] [facility] [address] Phone   [phone] (Home)   [phone] (Work)   [phone] (Mobile)   Emergency Contact(s)    Name Relation Home Work Mobile   Ilan Rodriguez Spouse   [phone]   PCP and Center    Primary Care Provider Phone Center   Roderick Allred [phone] None   Patient Employment    Status Employer Address   Full Time [employer] [address] [place] [address]   Active Insurance as of 12/3/2020    MISC COMMERCIAL - MISC COMMERCIAL    Payor Plan Insurance Group Employer/Plan Group   MISC COMMERCIAL MISC COMMERCIAL [id number]    Coverage Address Coverage Phone Number Coverage Fax Number Effective Dates   [address] [phone]  2020 - None Entered   [facility] [id number]      Subscriber Name Subscriber Birth Date Member ID    NIRAV RODRIGUEZ 1970 [id number]    Current Medications    Acetone, Urine, Test (TRUEPLUS KETONE TEST STRIPS)   atorvastatin (LIPITOR) 40 MG tablet   azithromycin (ZITHROMAX) 250 MG tablet   Continuous Blood Gluc  (FREESTYLE YIFAN 14 DAY READER) device   Continuous Blood Gluc Sensor (FREESTYLE YIFAN 14 DAY SENSOR) [facility] [place]   insulin regular (HUMULIN R) 500 UNIT/ML CONCENTRATED injection   Insulin Regular Human 4 & 8 & 12 units powder    levonorgestrel (MIRENA) 20 MCG/24HR IUD   losartan (COZAAR) 100 MG tablet   metoprolol succinate XL (TOPROL-XL) 25 MG 24 hr tablet   ONE TOUCH ULTRA TEST test strip   promethazine-dextromethorphan (PROMETHAZINE-DM) 6.25-15 MG/5ML syrup   Allergies    Sulfa Antibiotics Shortness Of Breath              Yes

## 2021-02-15 ENCOUNTER — TRANSCRIBE ORDERS (OUTPATIENT)
Dept: LAB | Facility: HOSPITAL | Age: 51
End: 2021-02-15

## 2021-02-15 DIAGNOSIS — R94.4 ABNORMAL RENAL FUNCTION TEST: Primary | ICD-10-CM

## 2021-03-10 ENCOUNTER — IMMUNIZATION (OUTPATIENT)
Dept: VACCINE CLINIC | Facility: HOSPITAL | Age: 51
End: 2021-03-10

## 2021-03-10 PROCEDURE — 91300 HC SARSCOV02 VAC 30MCG/0.3ML IM: CPT | Performed by: THORACIC SURGERY (CARDIOTHORACIC VASCULAR SURGERY)

## 2021-03-10 PROCEDURE — 0001A: CPT | Performed by: THORACIC SURGERY (CARDIOTHORACIC VASCULAR SURGERY)

## 2021-03-19 ENCOUNTER — TELEPHONE (OUTPATIENT)
Dept: ENDOCRINOLOGY | Facility: CLINIC | Age: 51
End: 2021-03-19

## 2021-03-19 NOTE — TELEPHONE ENCOUNTER
Pt needs our lab orders mailed to her so she can have all done at one time     [address] [place] [address]

## 2021-03-31 ENCOUNTER — IMMUNIZATION (OUTPATIENT)
Dept: VACCINE CLINIC | Facility: HOSPITAL | Age: 51
End: 2021-03-31

## 2021-03-31 PROCEDURE — 91300 HC SARSCOV02 VAC 30MCG/0.3ML IM: CPT | Performed by: THORACIC SURGERY (CARDIOTHORACIC VASCULAR SURGERY)

## 2021-03-31 PROCEDURE — 0002A: CPT | Performed by: THORACIC SURGERY (CARDIOTHORACIC VASCULAR SURGERY)

## 2021-04-12 RX ORDER — INSULIN HUMAN 500 [IU]/ML
INJECTION, SOLUTION SUBCUTANEOUS
Qty: 40 ML | Refills: 11 | Status: SHIPPED | OUTPATIENT
Start: 2021-04-12 | End: 2022-07-06

## 2021-04-12 RX ORDER — FLASH GLUCOSE SENSOR
KIT MISCELLANEOUS
Qty: 2 EACH | Refills: 10 | Status: SHIPPED | OUTPATIENT
Start: 2021-04-12 | End: 2021-06-03 | Stop reason: SDUPTHER

## 2021-06-01 RX ORDER — METOPROLOL SUCCINATE 25 MG/1
25 TABLET, EXTENDED RELEASE ORAL DAILY
Qty: 90 TABLET | Refills: 3 | Status: SHIPPED | OUTPATIENT
Start: 2021-06-01 | End: 2021-11-20 | Stop reason: SDUPTHER

## 2021-06-03 ENCOUNTER — OFFICE VISIT (OUTPATIENT)
Dept: ENDOCRINOLOGY | Facility: CLINIC | Age: 51
End: 2021-06-03

## 2021-06-03 VITALS
OXYGEN SATURATION: 99 % | BODY MASS INDEX: 46.08 KG/M2 | WEIGHT: 269.9 LBS | HEART RATE: 86 BPM | HEIGHT: 64 IN | SYSTOLIC BLOOD PRESSURE: 142 MMHG | DIASTOLIC BLOOD PRESSURE: 88 MMHG

## 2021-06-03 DIAGNOSIS — E11.59 HYPERTENSION ASSOCIATED WITH DIABETES (HCC): ICD-10-CM

## 2021-06-03 DIAGNOSIS — E10.69 MIXED DIABETIC HYPERLIPIDEMIA ASSOCIATED WITH TYPE 1 DIABETES MELLITUS (HCC): ICD-10-CM

## 2021-06-03 DIAGNOSIS — I15.2 HYPERTENSION ASSOCIATED WITH DIABETES (HCC): ICD-10-CM

## 2021-06-03 DIAGNOSIS — E78.2 MIXED DIABETIC HYPERLIPIDEMIA ASSOCIATED WITH TYPE 1 DIABETES MELLITUS (HCC): ICD-10-CM

## 2021-06-03 DIAGNOSIS — E10.65 TYPE 1 DIABETES MELLITUS WITH HYPERGLYCEMIA (HCC): Primary | ICD-10-CM

## 2021-06-03 PROCEDURE — 99214 OFFICE O/P EST MOD 30 MIN: CPT | Performed by: INTERNAL MEDICINE

## 2021-06-03 PROCEDURE — 95251 CONT GLUC MNTR ANALYSIS I&R: CPT | Performed by: INTERNAL MEDICINE

## 2021-06-03 RX ORDER — FLASH GLUCOSE SENSOR
1 KIT MISCELLANEOUS
Qty: 2 EACH | Refills: 11 | Status: SHIPPED | OUTPATIENT
Start: 2021-06-03 | End: 2021-12-10

## 2021-06-03 RX ORDER — ATORVASTATIN CALCIUM 40 MG/1
40 TABLET, FILM COATED ORAL DAILY
Qty: 90 TABLET | Refills: 3 | Status: SHIPPED | OUTPATIENT
Start: 2021-06-03 | End: 2021-11-15

## 2021-06-03 RX ORDER — PROCHLORPERAZINE 25 MG/1
1 SUPPOSITORY RECTAL ONCE
Qty: 1 EACH | Refills: 3 | Status: SHIPPED | OUTPATIENT
Start: 2021-06-03 | End: 2021-06-03

## 2021-06-03 RX ORDER — PROCHLORPERAZINE 25 MG/1
SUPPOSITORY RECTAL AS NEEDED
Qty: 9 EACH | Refills: 3 | Status: SHIPPED | OUTPATIENT
Start: 2021-06-03 | End: 2021-12-10

## 2021-06-03 NOTE — PROGRESS NOTES
"  Subjective    Jazmine Veronica Rodriguez is a 50 y.o. female. she is here today for follow-up of type 1 diabetes         History of Present Illness    T1DM  more than 10 y complicated by CKD and retinopathy      Uses u500 insulin through pump     Main symptom is weight gain and difficulty controlling diabetes    PE    /88   Pulse 86   Ht 162.6 cm (64\")   Wt 122 kg (269 lb 14.4 oz)   SpO2 99%   BMI 46.33 kg/m²   AOx3  No visible goiter  RRR  CTA  No Edema    Labs    Lab Results   Component Value Date    WBC 7.63 08/24/2020    HGB 11.4 (L) 08/24/2020    HCT 33.4 (L) 08/24/2020    MCV 87.9 08/24/2020     08/24/2020     Lab Results   Component Value Date    GLUCOSE 218 (H) 08/24/2020    BUN 16 08/24/2020    CREATININE 1.12 (H) 08/24/2020    EGFRIFNONA 51 (L) 08/24/2020    BCR 14.3 08/24/2020    K 4.5 08/24/2020    CO2 22.4 08/24/2020    CALCIUM 9.2 08/24/2020    ALBUMIN 4.10 08/24/2020    AST 19 08/24/2020    ALT 36 (H) 08/24/2020         Assessment/Plan      1. Type 1 diabetes mellitus with hyperglycemia (CMS/[facility])    2. Hypertension associated with diabetes (CMS/[facility])    3. Mixed diabetic hyperlipidemia associated with type 1 diabetes mellitus (CMS/[facility])    .  T1DM    Glycemic management   Joanie 2 weeks reviewed              Lab Results   Component Value Date    HGBA1C 8.60 (H) 08/24/2020    HGBA1C 9.6 (H) 01/08/2019    HGBA1C 9.4 (H) 03/21/2018       Set doses of U500 and does as good of estimating as I can expect  Improve diet       Basal insulin      0.85     Change to 670 G with u 100 when finances allow     victoza and sglt2 side effects  afrezza didn't work         Lipid Management     Lab Results   Component Value Date    CHOL 162 08/24/2020    CHLPL 178 10/28/2016    TRIG 223 (H) 08/24/2020    HDL 32 (L) 08/24/2020    LDL 85 08/24/2020        Lipitor 40 mg tablet       Blood Pressure Management:    /88   Pulse 86   Ht 162.6 cm (64\")   Wt 122 kg (269 lb 14.4 oz)   SpO2 99%   BMI 46.33 kg/m² "       Per nephrology     Microvascular Complication Monitoring:     nephropathy ckd stage III, stable - following w nephrology     no neuropathy       had vitreous hemorrhage, stopped aspirin , now stable disease    Weight Related:   Sleep apnea positive     she will have cpap titration     should use compression stockings     --  start contrave - not effective     exercising and limiting carbs but still not able to lose weight            [facility]      Lab Results   Component Value Date    UHLA98AI 35.9 08/24/2020    NJHI01MO 37.2 01/24/2020    NYSF11CI 46.5 03/19/2019            MNG with subcm nodules     US personally reviewed , stable from May 2013 to Nov 2016     Lab Results   Component Value Date    TSH 2.400 08/24/2020     Lab Results   Component Value Date    PKPUARPY92 327 08/24/2020          5-14 no celiac disease

## 2021-06-07 ENCOUNTER — OFFICE VISIT (OUTPATIENT)
Dept: SLEEP MEDICINE | Facility: HOSPITAL | Age: 51
End: 2021-06-07

## 2021-06-07 VITALS
WEIGHT: 268.2 LBS | HEIGHT: 64 IN | SYSTOLIC BLOOD PRESSURE: 134 MMHG | BODY MASS INDEX: 45.79 KG/M2 | HEART RATE: 83 BPM | OXYGEN SATURATION: 97 % | DIASTOLIC BLOOD PRESSURE: 64 MMHG

## 2021-06-07 DIAGNOSIS — G47.33 OSA (OBSTRUCTIVE SLEEP APNEA): Primary | ICD-10-CM

## 2021-06-07 PROCEDURE — 99212 OFFICE O/P EST SF 10 MIN: CPT | Performed by: NURSE PRACTITIONER
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
## 2021-09-02 ENCOUNTER — TELEPHONE (OUTPATIENT)
Dept: ENDOCRINOLOGY | Facility: CLINIC | Age: 51
End: 2021-09-02

## 2021-09-02 DIAGNOSIS — E11.59 HYPERTENSION ASSOCIATED WITH DIABETES (HCC): ICD-10-CM

## 2021-09-02 DIAGNOSIS — E78.2 MIXED DIABETIC HYPERLIPIDEMIA ASSOCIATED WITH TYPE 1 DIABETES MELLITUS (HCC): ICD-10-CM

## 2021-09-02 DIAGNOSIS — E55.9 VITAMIN D DEFICIENCY: ICD-10-CM

## 2021-09-02 DIAGNOSIS — E10.65 TYPE 1 DIABETES MELLITUS WITH HYPERGLYCEMIA (HCC): Primary | ICD-10-CM

## 2021-09-02 DIAGNOSIS — E10.69 MIXED DIABETIC HYPERLIPIDEMIA ASSOCIATED WITH TYPE 1 DIABETES MELLITUS (HCC): ICD-10-CM

## 2021-09-02 DIAGNOSIS — I15.2 HYPERTENSION ASSOCIATED WITH DIABETES (HCC): ICD-10-CM

## 2021-09-02 DIAGNOSIS — E53.8 B12 DEFICIENCY: ICD-10-CM

## 2021-09-07 ENCOUNTER — OFFICE VISIT (OUTPATIENT)
Dept: SLEEP MEDICINE | Facility: HOSPITAL | Age: 51
End: 2021-09-07

## 2021-09-07 VITALS
OXYGEN SATURATION: 95 % | DIASTOLIC BLOOD PRESSURE: 61 MMHG | HEART RATE: 80 BPM | WEIGHT: 267.6 LBS | SYSTOLIC BLOOD PRESSURE: 148 MMHG | HEIGHT: 64 IN | BODY MASS INDEX: 45.68 KG/M2

## 2021-09-07 DIAGNOSIS — G47.33 OBSTRUCTIVE SLEEP APNEA, ADULT: Primary | ICD-10-CM

## 2021-09-07 PROCEDURE — 99212 OFFICE O/P EST SF 10 MIN: CPT | Performed by: NURSE PRACTITIONER

## 2021-09-07 NOTE — PROGRESS NOTES
Sleep Clinic Follow Up    Date: 2021  Primary Care Provider: Roderick Marx MD    Last office visit: 2021 (I reviewed this note)    CC: Follow up: ARLENE on CPAP, new machine       Interim History:  Since the last visit:    1) severe ARLENE -  Jazmine Rodriguez has remained compliant with CPAP. She denies mask and machine issues, dry mouth, headaches, or pressures intolerance. She denies abnormal dreams, sleep paralysis, nasal congestion, URI sx.  Overall she is doing well on PAP therapy.     2) Patient denies RLS symptoms.       Sleep Testin. Split night PSG on 2013, AHI of 30.6 recommended 11 cm H2O  2. Currently on 11 cm H2O    PAP Data:    Time frame: 2021-2021   Compliance: 100 %  Average use on days used: 8hrs 8 min  Percent of days with usage greater than or equal to 4 hours: 100%  PAP range: 11 cm H2O  Average 90% pressure: 11 cmH2O  Leak: 9.9 L/ minutes  Average AHI: 0.6 events/hr  Mask type: Nasal mask  DME: [facility]     Bed time: 2790-3085  Sleep latency: 0-60 minutes  Number of times awakens during the night: 2-3  Wake time: 0530  Estimated total sleep time at night: 6-7 hours  Caffeine intake: 1 cups of coffee, 0 cups of tea, and 0-1 sodas per day  Alcohol intake: 1-2 drinks per week  Nap time: denies    Sleepiness with Driving: rare- denies close calls or accidents related to falling asleep at the wheel      [place] - 4        PMHx, FH, SH reviewed and pertinent changes are:  unchanged from last office visit on 2021      REVIEW OF SYSTEMS:   Positive- occasional leg swelling she wears compression socks   Negative for chest pain, SOA, fever, chills, cough, N/V/D, abdominal pain.    Smoking:none           Exam:  Vitals:    21 1639   BP: 148/61   Pulse: 80   SpO2: 95%           21  1639   Weight: 121 kg (267 lb 9.6 oz)     Body mass index is 45.91 kg/m². Patient's Body mass index is 45.91 kg/m². indicating that she is morbidly obese (BMI > 40 or > 35  with obesity - related health condition). Obesity-related health conditions include the following: obstructive sleep apnea. Obesity is unchanged. BMI is is above average; BMI management plan is completed. We discussed portion control and increasing exercise..      Gen:                No distress, conversant, pleasant, appears stated age, alert, oriented  Eyes:               Anicteric sclera, moist conjunctiva, no lid lag                           EOMI   Lungs:             normal effort, non-labored breathing                          Clear to auscultation bilaterally          CV:                  Normal S1/S2, no murmur                          no lower extremity edema                 Psych:             Appropriate affect  Neuro:             CN 2-12 appear intact    Past Medical History:   Diagnosis Date   • Allergic rhinitis    • Astigmatism    • Borderline glaucoma    • Diabetic retinopathy (CMS/[facility])     prob PDR OD   • Dyslipidemia    • Essential hypertension    • GERD (gastroesophageal reflux disease)    • Nonproliferative diabetic retinopathy (CMS/[facility])     possible occult PDR OD      • Puncture wound without foreign body, right lower leg, initial encounter    • Sinus headache    • Type 1 diabetes mellitus (CMS/[facility])    • Type 1 diabetes mellitus with stage 3 chronic kidney disease (CMS/[facility]) 4/20/2018   • Vitreous hemorrhage (CMS/[facility])     s/p vitrectomy OD, doing well          Current Outpatient Medications:   •  Acetone, Urine, Test (TRUEPLUS KETONE TEST STRIPS), 1 each by Bladder  route 3 (Three) Times a Day. Every 8 hours of fasting check ketones, Disp: 90 each, Rfl: 3  •  atorvastatin (LIPITOR) 40 MG tablet, Take 1 tablet by mouth Daily., Disp: 90 tablet, Rfl: 3  •  Continuous Blood Gluc Sensor (Dexcom G6 Sensor), As Needed (glucose control). Every 10 daysDexcom G6 Sensor Kit [id number] Use as directed for continuous glucose monitoring, Disp: 9 each, Rfl: 3  •  Continuous Blood Gluc Sensor (FreeStyle Joanie  14 Day Sensor) misc, Inject 1 each under the skin into the appropriate area as directed Every 14 (Fourteen) Days., Disp: 2 each, Rfl: 11  •  insulin regular (HUMULIN R) 500 UNIT/ML CONCENTRATED injection, INJECT 1 TO 1.3 ML VIA PUMP DAILY, Disp: 40 mL, Rfl: 11  •  levonorgestrel (MIRENA) 20 MCG/24HR IUD, 1 each by Intrauterine route 1 (One) Time., Disp: , Rfl:   •  losartan (COZAAR) 100 MG tablet, Take 1 tablet by mouth Daily., Disp: 90 tablet, Rfl: 3  •  metoprolol succinate XL (TOPROL-XL) 25 MG 24 hr tablet, TAKE 1 TABLET BY MOUTH  DAILY, Disp: 90 tablet, Rfl: 3  •  ONE TOUCH ULTRA TEST test strip, TEST 4 TIMES DAILY, Disp: 400 each, Rfl: 3      Assessment and Plan:    1. Obstructive sleep apnea  -Established, stable (1)  1. Compliant with PAP therapy  2. Continue PAP as prescribed  3. Script for PAP supplies  4. Drowsy driving tips- do not drive if feeling sleepy   5. Return to clinic in 1 year with compliance report unless change in symptoms in interim period          I spent 10 minutes caring for Jazmine on this date of service. This time includes time spent by me in the following activities: preparing for the visit, obtaining and/or reviewing a separately obtained history, performing a medically appropriate examination and/or evaluation, counseling and educating the patient/family/caregiver, ordering medications, tests, or procedures and documenting information in the medical record.           This document has been electronically signed by ADALBERTO Reich on September 7, 2021 16:42 CDT            CC: Roderick Marx MD          No ref. provider found

## 2021-10-20 ENCOUNTER — DOCUMENTATION (OUTPATIENT)
Dept: ENDOCRINOLOGY | Facility: CLINIC | Age: 51
End: 2021-10-20

## 2021-10-26 ENCOUNTER — TRANSCRIBE ORDERS (OUTPATIENT)
Dept: LAB | Facility: HOSPITAL | Age: 51
End: 2021-10-26

## 2021-10-26 DIAGNOSIS — N18.30 STAGE 3 CHRONIC KIDNEY DISEASE, UNSPECIFIED WHETHER STAGE 3A OR 3B CKD (HCC): Primary | ICD-10-CM

## 2021-10-26 DIAGNOSIS — R80.9 MICROALBUMINURIA: ICD-10-CM

## 2021-11-15 RX ORDER — ATORVASTATIN CALCIUM 40 MG/1
TABLET, FILM COATED ORAL
Qty: 90 TABLET | Refills: 3 | Status: SHIPPED | OUTPATIENT
Start: 2021-11-15 | End: 2022-11-09

## 2021-11-15 RX ORDER — LOSARTAN POTASSIUM 100 MG/1
TABLET ORAL
Qty: 90 TABLET | Refills: 3 | Status: SHIPPED | OUTPATIENT
Start: 2021-11-15 | End: 2022-11-09

## 2021-11-19 ENCOUNTER — LAB (OUTPATIENT)
Dept: LAB | Facility: HOSPITAL | Age: 51
End: 2021-11-19

## 2021-11-19 DIAGNOSIS — E55.9 VITAMIN D DEFICIENCY: ICD-10-CM

## 2021-11-19 DIAGNOSIS — E11.59 HYPERTENSION ASSOCIATED WITH DIABETES (HCC): ICD-10-CM

## 2021-11-19 DIAGNOSIS — I15.2 HYPERTENSION ASSOCIATED WITH DIABETES (HCC): ICD-10-CM

## 2021-11-19 DIAGNOSIS — E10.8 TYPE 1 DIABETES MELLITUS WITH COMPLICATION (HCC): ICD-10-CM

## 2021-11-19 DIAGNOSIS — E78.2 MIXED DIABETIC HYPERLIPIDEMIA ASSOCIATED WITH TYPE 1 DIABETES MELLITUS (HCC): ICD-10-CM

## 2021-11-19 DIAGNOSIS — E53.8 B12 DEFICIENCY: ICD-10-CM

## 2021-11-19 DIAGNOSIS — E10.69 MIXED DIABETIC HYPERLIPIDEMIA ASSOCIATED WITH TYPE 1 DIABETES MELLITUS (HCC): ICD-10-CM

## 2021-11-19 LAB
25(OH)D3 SERPL-MCNC: 47.9 NG/ML
ALBUMIN SERPL-MCNC: 4.3 G/DL (ref 3.5–5.2)
ALBUMIN/GLOB SERPL: 1.4 G/DL
ALP SERPL-CCNC: 105 U/L (ref 39–117)
ALT SERPL W P-5'-P-CCNC: 25 U/L (ref 1–33)
ANION GAP SERPL CALCULATED.3IONS-SCNC: 7.5 MMOL/L (ref 5–15)
AST SERPL-CCNC: 20 U/L (ref 1–32)
BASOPHILS # BLD AUTO: 0.04 10*3/MM3 (ref 0–0.2)
BASOPHILS NFR BLD AUTO: 0.6 % (ref 0–1.5)
BILIRUB SERPL-MCNC: 0.7 MG/DL (ref 0–1.2)
BUN SERPL-MCNC: 23 MG/DL (ref 6–20)
BUN/CREAT SERPL: 18.7 (ref 7–25)
CALCIUM SPEC-SCNC: 9.6 MG/DL (ref 8.6–10.5)
CHLORIDE SERPL-SCNC: 100 MMOL/L (ref 98–107)
CHOLEST SERPL-MCNC: 149 MG/DL (ref 0–200)
CO2 SERPL-SCNC: 26.5 MMOL/L (ref 22–29)
CREAT SERPL-MCNC: 1.23 MG/DL (ref 0.57–1)
DEPRECATED RDW RBC AUTO: 41.7 FL (ref 37–54)
EOSINOPHIL # BLD AUTO: 0.36 10*3/MM3 (ref 0–0.4)
EOSINOPHIL NFR BLD AUTO: 5 % (ref 0.3–6.2)
ERYTHROCYTE [DISTWIDTH] IN BLOOD BY AUTOMATED COUNT: 12.8 % (ref 12.3–15.4)
ERYTHROCYTE [SEDIMENTATION RATE] IN BLOOD: 36 MM/HR (ref 0–30)
GFR SERPL CREATININE-BSD FRML MDRD: 46 ML/MIN/1.73
GLOBULIN UR ELPH-MCNC: 3 GM/DL
GLUCOSE SERPL-MCNC: 276 MG/DL (ref 65–99)
HBA1C MFR BLD: 9.46 % (ref 4.8–5.6)
HCT VFR BLD AUTO: 37.5 % (ref 34–46.6)
HDLC SERPL-MCNC: 37 MG/DL (ref 40–60)
HGB BLD-MCNC: 12.2 G/DL (ref 12–15.9)
IMM GRANULOCYTES # BLD AUTO: 0.01 10*3/MM3 (ref 0–0.05)
IMM GRANULOCYTES NFR BLD AUTO: 0.1 % (ref 0–0.5)
LDLC SERPL CALC-MCNC: 91 MG/DL (ref 0–100)
LDLC/HDLC SERPL: 2.41 {RATIO}
LYMPHOCYTES # BLD AUTO: 2.12 10*3/MM3 (ref 0.7–3.1)
LYMPHOCYTES NFR BLD AUTO: 29.2 % (ref 19.6–45.3)
MCH RBC QN AUTO: 29 PG (ref 26.6–33)
MCHC RBC AUTO-ENTMCNC: 32.5 G/DL (ref 31.5–35.7)
MCV RBC AUTO: 89.1 FL (ref 79–97)
MONOCYTES # BLD AUTO: 0.47 10*3/MM3 (ref 0.1–0.9)
MONOCYTES NFR BLD AUTO: 6.5 % (ref 5–12)
NEUTROPHILS NFR BLD AUTO: 4.25 10*3/MM3 (ref 1.7–7)
NEUTROPHILS NFR BLD AUTO: 58.6 % (ref 42.7–76)
NRBC BLD AUTO-RTO: 0 /100 WBC (ref 0–0.2)
PHOSPHATE SERPL-MCNC: 3.1 MG/DL (ref 2.5–4.5)
PLATELET # BLD AUTO: 239 10*3/MM3 (ref 140–450)
PMV BLD AUTO: 10.5 FL (ref 6–12)
POTASSIUM SERPL-SCNC: 4.4 MMOL/L (ref 3.5–5.2)
PROT SERPL-MCNC: 7.3 G/DL (ref 6–8.5)
RBC # BLD AUTO: 4.21 10*6/MM3 (ref 3.77–5.28)
SODIUM SERPL-SCNC: 134 MMOL/L (ref 136–145)
TRIGL SERPL-MCNC: 115 MG/DL (ref 0–150)
TSH SERPL DL<=0.05 MIU/L-ACNC: 1.31 UIU/ML (ref 0.27–4.2)
VIT B12 BLD-MCNC: 451 PG/ML (ref 211–946)
VLDLC SERPL-MCNC: 21 MG/DL (ref 5–40)
WBC NRBC COR # BLD: 7.25 10*3/MM3 (ref 3.4–10.8)

## 2021-11-19 PROCEDURE — 83036 HEMOGLOBIN GLYCOSYLATED A1C: CPT

## 2021-11-19 PROCEDURE — 82306 VITAMIN D 25 HYDROXY: CPT

## 2021-11-19 PROCEDURE — 86225 DNA ANTIBODY NATIVE: CPT | Performed by: INTERNAL MEDICINE

## 2021-11-19 PROCEDURE — 86160 COMPLEMENT ANTIGEN: CPT | Performed by: INTERNAL MEDICINE

## 2021-11-19 PROCEDURE — 80053 COMPREHEN METABOLIC PANEL: CPT

## 2021-11-19 PROCEDURE — 85652 RBC SED RATE AUTOMATED: CPT | Performed by: INTERNAL MEDICINE

## 2021-11-19 PROCEDURE — 36415 COLL VENOUS BLD VENIPUNCTURE: CPT

## 2021-11-19 PROCEDURE — 85025 COMPLETE CBC W/AUTO DIFF WBC: CPT

## 2021-11-19 PROCEDURE — 82607 VITAMIN B-12: CPT

## 2021-11-19 PROCEDURE — 80061 LIPID PANEL: CPT

## 2021-11-19 PROCEDURE — 84100 ASSAY OF PHOSPHORUS: CPT | Performed by: INTERNAL MEDICINE

## 2021-11-19 PROCEDURE — 84443 ASSAY THYROID STIM HORMONE: CPT

## 2021-11-20 RX ORDER — METOPROLOL SUCCINATE 25 MG/1
25 TABLET, EXTENDED RELEASE ORAL DAILY
Qty: 90 TABLET | Refills: 3 | Status: SHIPPED | OUTPATIENT
Start: 2021-11-20 | End: 2022-12-02

## 2021-11-22 ENCOUNTER — LAB (OUTPATIENT)
Dept: LAB | Facility: HOSPITAL | Age: 51
End: 2021-11-22

## 2021-11-22 ENCOUNTER — TRANSCRIBE ORDERS (OUTPATIENT)
Dept: LAB | Facility: HOSPITAL | Age: 51
End: 2021-11-22

## 2021-11-22 DIAGNOSIS — N18.30 STAGE 3 CHRONIC KIDNEY DISEASE, UNSPECIFIED WHETHER STAGE 3A OR 3B CKD (HCC): Primary | ICD-10-CM

## 2021-11-22 LAB
C3 SERPL-MCNC: 192 MG/DL (ref 82–167)
C4 SERPL-MCNC: 27 MG/DL (ref 12–38)
DSDNA AB SER-ACNC: <1 IU/ML (ref 0–9)

## 2021-11-22 PROCEDURE — 84156 ASSAY OF PROTEIN URINE: CPT | Performed by: INTERNAL MEDICINE

## 2021-11-22 PROCEDURE — 82043 UR ALBUMIN QUANTITATIVE: CPT | Performed by: INTERNAL MEDICINE

## 2021-11-22 PROCEDURE — 82570 ASSAY OF URINE CREATININE: CPT | Performed by: INTERNAL MEDICINE

## 2021-11-23 LAB
ALBUMIN UR-MCNC: 1.2 MG/DL
CREAT UR-MCNC: 89.7 MG/DL
CREAT UR-MCNC: 89.7 MG/DL
MICROALBUMIN/CREAT UR: 13.4 MG/G
PROT ?TM UR-MCNC: 7 MG/DL
PROT/CREAT UR: 78 MG/G CREA (ref 0–200)

## 2021-12-10 ENCOUNTER — OFFICE VISIT (OUTPATIENT)
Dept: ENDOCRINOLOGY | Facility: CLINIC | Age: 51
End: 2021-12-10

## 2021-12-10 ENCOUNTER — SPECIALTY PHARMACY (OUTPATIENT)
Dept: ENDOCRINOLOGY | Facility: CLINIC | Age: 51
End: 2021-12-10

## 2021-12-10 ENCOUNTER — DOCUMENTATION (OUTPATIENT)
Dept: ENDOCRINOLOGY | Facility: CLINIC | Age: 51
End: 2021-12-10

## 2021-12-10 VITALS
WEIGHT: 268 LBS | SYSTOLIC BLOOD PRESSURE: 122 MMHG | DIASTOLIC BLOOD PRESSURE: 56 MMHG | OXYGEN SATURATION: 99 % | HEART RATE: 75 BPM | HEIGHT: 65 IN | BODY MASS INDEX: 44.65 KG/M2

## 2021-12-10 DIAGNOSIS — I15.2 HYPERTENSION ASSOCIATED WITH DIABETES (HCC): ICD-10-CM

## 2021-12-10 DIAGNOSIS — E78.2 MIXED DIABETIC HYPERLIPIDEMIA ASSOCIATED WITH TYPE 1 DIABETES MELLITUS (HCC): ICD-10-CM

## 2021-12-10 DIAGNOSIS — E10.65 TYPE 1 DIABETES MELLITUS WITH HYPERGLYCEMIA (HCC): Primary | ICD-10-CM

## 2021-12-10 DIAGNOSIS — E10.69 MIXED DIABETIC HYPERLIPIDEMIA ASSOCIATED WITH TYPE 1 DIABETES MELLITUS (HCC): ICD-10-CM

## 2021-12-10 DIAGNOSIS — E53.8 B12 DEFICIENCY: ICD-10-CM

## 2021-12-10 DIAGNOSIS — E11.59 HYPERTENSION ASSOCIATED WITH DIABETES (HCC): ICD-10-CM

## 2021-12-10 DIAGNOSIS — E55.9 VITAMIN D DEFICIENCY: ICD-10-CM

## 2021-12-10 PROCEDURE — 99214 OFFICE O/P EST MOD 30 MIN: CPT | Performed by: INTERNAL MEDICINE

## 2021-12-10 RX ORDER — PROCHLORPERAZINE 25 MG/1
SUPPOSITORY RECTAL AS NEEDED
Qty: 9 EACH | Refills: 3 | Status: SHIPPED | OUTPATIENT
Start: 2021-12-10 | End: 2021-12-20 | Stop reason: SDUPTHER

## 2021-12-10 RX ORDER — PROCHLORPERAZINE 25 MG/1
1 SUPPOSITORY RECTAL ONCE
Qty: 1 EACH | Refills: 3 | Status: SHIPPED | OUTPATIENT
Start: 2021-12-10 | End: 2021-12-20 | Stop reason: SDUPTHER

## 2021-12-10 NOTE — PROGRESS NOTES
"  Subjective    Jazminedennis Rodriguez is a 51 y.o. female. she is here today for follow-up of type 1 diabetes         History of Present Illness    T1DM  more than 10 y complicated by CKD and retinopathy      Uses u500 insulin through pump     Main symptom is weight gain and difficulty controlling diabetes    PE    /56   Pulse 75   Ht 165.1 cm (65\")   Wt 122 kg (268 lb)   SpO2 99%   BMI 44.60 kg/m²   AOx3  No visible goiter  RRR  CTA  No Edema    Labs    Lab Results   Component Value Date    WBC 7.25 11/19/2021    HGB 12.2 11/19/2021    HCT 37.5 11/19/2021    MCV 89.1 11/19/2021     11/19/2021     Lab Results   Component Value Date    GLUCOSE 276 (H) 11/19/2021    BUN 23 (H) 11/19/2021    CREATININE 1.23 (H) 11/19/2021    EGFRIFNONA 46 (L) 11/19/2021    BCR 18.7 11/19/2021    K 4.4 11/19/2021    CO2 26.5 11/19/2021    CALCIUM 9.6 11/19/2021    ALBUMIN 4.30 11/19/2021    AST 20 11/19/2021    ALT 25 11/19/2021         Assessment/Plan      1. Type 1 diabetes mellitus with hyperglycemia (HCC)    2. Hypertension associated with diabetes (HCC)    3. Mixed diabetic hyperlipidemia associated with type 1 diabetes mellitus (HCC)    4. Vitamin D deficiency    5. B12 deficiency    .  T1DM    Glycemic management   Joanie 2 weeks reviewed  Type 1 dm w hyperglycemia              Lab Results   Component Value Date    HGBA1C 9.46 (H) 11/19/2021    HGBA1C 8.60 (H) 08/24/2020    HGBA1C 9.6 (H) 01/08/2019       Set doses of U500 and does as good of estimating as I can expect  Improve diet       Basal insulin      0.85     victoza and sglt2 side effects  Restart afrezza 4 to 32 untis w meals tid     Approve for dexcom and tandem to replace joanie and 530 G [employer]     Criteria for Approval of CGM and/or Insulin Pump     The patient has diabetes mellitus, insulin-dependent.    Our Diabetes Department has evaluated the patient in the last six months and will continue counseling on insulin adjustment.     The patient performs " "blood glucose testing at least four times daily with proven glucose variability from 50 to 300 mg per dl.    The patient is administering basal insulin and prandial insulin four times per day for more than six months.    The patient uses a home blood glucose monitor to assess blood glucose at least four times daily for more than six months.    The patient requires frequent adjustment of insulin treatment regimen based on blood glucose readings.    The patient has frequent variability in blood glucose readings due to activity and variability in meal content and time.     The patient has completed a diabetes education program with us.    The patient has demonstrated the ability to self-monitor her glucose.     The patient is motivated in improving diabetes control          Lipid Management     Lab Results   Component Value Date    CHOL 149 11/19/2021    CHLPL 178 10/28/2016    TRIG 115 11/19/2021    HDL 37 (L) 11/19/2021    LDL 91 11/19/2021        Lipitor 40 mg tablet       Blood Pressure Management:    /56   Pulse 75   Ht 165.1 cm (65\")   Wt 122 kg (268 lb)   SpO2 99%   BMI 44.60 kg/m²       Per nephrology     Microvascular Complication Monitoring:     nephropathy ckd stage III, stable - following w nephrology     no neuropathy       had vitreous hemorrhage, stopped aspirin , now stable disease    Weight Related:   Sleep apnea positive     she will have cpap titration     should use compression stockings     --  start contrave - not effective     exercising and limiting carbs but still not able to lose weight            Bone Health      Lab Results   Component Value Date    EZPF67DJ 47.9 11/19/2021    JZMN70XT 35.9 08/24/2020    IPGW24DV 37.2 01/24/2020            MNG with subcm nodules     US personally reviewed , stable from May 2013 to Nov 2016     Lab Results   Component Value Date    TSH 1.310 11/19/2021     Lab Results   Component Value Date    IPBROCEO15 451 11/19/2021          5-14 no celiac " disease

## 2021-12-17 ENCOUNTER — DOCUMENTATION (OUTPATIENT)
Dept: ENDOCRINOLOGY | Facility: CLINIC | Age: 51
End: 2021-12-17

## 2021-12-20 ENCOUNTER — DOCUMENTATION (OUTPATIENT)
Dept: ENDOCRINOLOGY | Facility: CLINIC | Age: 51
End: 2021-12-20

## 2021-12-20 RX ORDER — PROCHLORPERAZINE 25 MG/1
1 SUPPOSITORY RECTAL ONCE
Qty: 1 EACH | Refills: 3 | Status: SHIPPED | OUTPATIENT
Start: 2021-12-20 | End: 2021-12-20

## 2021-12-20 RX ORDER — PROCHLORPERAZINE 25 MG/1
SUPPOSITORY RECTAL AS NEEDED
Qty: 9 EACH | Refills: 3 | Status: SHIPPED | OUTPATIENT
Start: 2021-12-20 | End: 2022-08-11 | Stop reason: SDUPTHER

## 2021-12-20 NOTE — PROGRESS NOTES
Patient was happy to use our service however her insurance is not going to allow that. We have communicated this with her. All rx have been switched back to [facility]. I have educated the patient on these medications and let the provider know as well.

## 2022-06-27 ENCOUNTER — LAB (OUTPATIENT)
Dept: LAB | Facility: HOSPITAL | Age: 52
End: 2022-06-27

## 2022-06-27 DIAGNOSIS — N18.30 STAGE 3 CHRONIC KIDNEY DISEASE, UNSPECIFIED WHETHER STAGE 3A OR 3B CKD: ICD-10-CM

## 2022-06-27 DIAGNOSIS — E10.65 TYPE 1 DIABETES MELLITUS WITH HYPERGLYCEMIA: ICD-10-CM

## 2022-06-27 DIAGNOSIS — E10.69 MIXED DIABETIC HYPERLIPIDEMIA ASSOCIATED WITH TYPE 1 DIABETES MELLITUS: ICD-10-CM

## 2022-06-27 DIAGNOSIS — E78.2 MIXED DIABETIC HYPERLIPIDEMIA ASSOCIATED WITH TYPE 1 DIABETES MELLITUS: ICD-10-CM

## 2022-06-27 DIAGNOSIS — E55.9 VITAMIN D DEFICIENCY: ICD-10-CM

## 2022-06-27 DIAGNOSIS — E53.8 B12 DEFICIENCY: ICD-10-CM

## 2022-06-27 DIAGNOSIS — E10.65 TYPE 1 DIABETES MELLITUS WITH HYPERGLYCEMIA: Primary | ICD-10-CM

## 2022-06-27 LAB
25(OH)D3 SERPL-MCNC: 40.5 NG/ML (ref 30–100)
ALBUMIN SERPL-MCNC: 3.9 G/DL (ref 3.5–5.2)
ALBUMIN SERPL-MCNC: 4 G/DL (ref 3.5–5.2)
ALBUMIN/GLOB SERPL: 1.3 G/DL
ALP SERPL-CCNC: 91 U/L (ref 39–117)
ALT SERPL W P-5'-P-CCNC: 20 U/L (ref 1–33)
ANION GAP SERPL CALCULATED.3IONS-SCNC: 11.9 MMOL/L (ref 5–15)
ANION GAP SERPL CALCULATED.3IONS-SCNC: 14.3 MMOL/L (ref 5–15)
AST SERPL-CCNC: 22 U/L (ref 1–32)
BASOPHILS # BLD AUTO: 0.06 10*3/MM3 (ref 0–0.2)
BASOPHILS NFR BLD AUTO: 0.6 % (ref 0–1.5)
BILIRUB SERPL-MCNC: 0.8 MG/DL (ref 0–1.2)
BILIRUB UR QL STRIP: NEGATIVE
BUN SERPL-MCNC: 23 MG/DL (ref 6–20)
BUN SERPL-MCNC: 24 MG/DL (ref 6–20)
BUN/CREAT SERPL: 19 (ref 7–25)
BUN/CREAT SERPL: 21.1 (ref 7–25)
CALCIUM SPEC-SCNC: 9.7 MG/DL (ref 8.6–10.5)
CALCIUM SPEC-SCNC: 9.7 MG/DL (ref 8.6–10.5)
CHLORIDE SERPL-SCNC: 103 MMOL/L (ref 98–107)
CHLORIDE SERPL-SCNC: 105 MMOL/L (ref 98–107)
CHOLEST SERPL-MCNC: 144 MG/DL (ref 0–200)
CLARITY UR: CLEAR
CO2 SERPL-SCNC: 21.1 MMOL/L (ref 22–29)
CO2 SERPL-SCNC: 21.7 MMOL/L (ref 22–29)
COLOR UR: YELLOW
CREAT SERPL-MCNC: 1.14 MG/DL (ref 0.57–1)
CREAT SERPL-MCNC: 1.21 MG/DL (ref 0.57–1)
CREAT UR-MCNC: 128.5 MG/DL
DEPRECATED RDW RBC AUTO: 42.5 FL (ref 37–54)
EGFRCR SERPLBLD CKD-EPI 2021: 54 ML/MIN/1.73
EGFRCR SERPLBLD CKD-EPI 2021: 58 ML/MIN/1.73
EOSINOPHIL # BLD AUTO: 0.41 10*3/MM3 (ref 0–0.4)
EOSINOPHIL NFR BLD AUTO: 4.3 % (ref 0.3–6.2)
ERYTHROCYTE [DISTWIDTH] IN BLOOD BY AUTOMATED COUNT: 13.7 % (ref 12.3–15.4)
GLOBULIN UR ELPH-MCNC: 3 GM/DL
GLUCOSE SERPL-MCNC: 133 MG/DL (ref 65–99)
GLUCOSE SERPL-MCNC: 135 MG/DL (ref 65–99)
GLUCOSE UR STRIP-MCNC: NEGATIVE MG/DL
HBA1C MFR BLD: 8.6 % (ref 4.8–5.6)
HCT VFR BLD AUTO: 35.8 % (ref 34–46.6)
HDLC SERPL-MCNC: 36 MG/DL (ref 40–60)
HGB BLD-MCNC: 12 G/DL (ref 12–15.9)
HGB UR QL STRIP.AUTO: NEGATIVE
IMM GRANULOCYTES # BLD AUTO: 0.03 10*3/MM3 (ref 0–0.05)
IMM GRANULOCYTES NFR BLD AUTO: 0.3 % (ref 0–0.5)
KETONES UR QL STRIP: NEGATIVE
LDLC SERPL CALC-MCNC: 81 MG/DL (ref 0–100)
LDLC/HDLC SERPL: 2.16 {RATIO}
LEUKOCYTE ESTERASE UR QL STRIP.AUTO: NEGATIVE
LYMPHOCYTES # BLD AUTO: 2.33 10*3/MM3 (ref 0.7–3.1)
LYMPHOCYTES NFR BLD AUTO: 24.4 % (ref 19.6–45.3)
MCH RBC QN AUTO: 28.8 PG (ref 26.6–33)
MCHC RBC AUTO-ENTMCNC: 33.5 G/DL (ref 31.5–35.7)
MCV RBC AUTO: 86.1 FL (ref 79–97)
MONOCYTES # BLD AUTO: 0.55 10*3/MM3 (ref 0.1–0.9)
MONOCYTES NFR BLD AUTO: 5.8 % (ref 5–12)
NEUTROPHILS NFR BLD AUTO: 6.17 10*3/MM3 (ref 1.7–7)
NEUTROPHILS NFR BLD AUTO: 64.6 % (ref 42.7–76)
NITRITE UR QL STRIP: NEGATIVE
NRBC BLD AUTO-RTO: 0 /100 WBC (ref 0–0.2)
PH UR STRIP.AUTO: 5.5 [PH] (ref 5–8)
PHOSPHATE SERPL-MCNC: 3.8 MG/DL (ref 2.5–4.5)
PLATELET # BLD AUTO: 214 10*3/MM3 (ref 140–450)
PMV BLD AUTO: 10.8 FL (ref 6–12)
POTASSIUM SERPL-SCNC: 4.7 MMOL/L (ref 3.5–5.2)
POTASSIUM SERPL-SCNC: 4.7 MMOL/L (ref 3.5–5.2)
PROT ?TM UR-MCNC: 6.2 MG/DL
PROT SERPL-MCNC: 7 G/DL (ref 6–8.5)
PROT UR QL STRIP: NEGATIVE
PROT/CREAT UR: 48.2 MG/G CREA (ref 0–200)
PTH-INTACT SERPL-MCNC: 30.3 PG/ML (ref 15–65)
RBC # BLD AUTO: 4.16 10*6/MM3 (ref 3.77–5.28)
SODIUM SERPL-SCNC: 138 MMOL/L (ref 136–145)
SODIUM SERPL-SCNC: 139 MMOL/L (ref 136–145)
SP GR UR STRIP: 1.02 (ref 1–1.03)
TRIGL SERPL-MCNC: 152 MG/DL (ref 0–150)
TSH SERPL DL<=0.05 MIU/L-ACNC: 1.99 UIU/ML (ref 0.27–4.2)
UROBILINOGEN UR QL STRIP: NORMAL
VIT B12 BLD-MCNC: 329 PG/ML (ref 211–946)
VLDLC SERPL-MCNC: 27 MG/DL (ref 5–40)
WBC NRBC COR # BLD: 9.55 10*3/MM3 (ref 3.4–10.8)

## 2022-06-27 PROCEDURE — 82570 ASSAY OF URINE CREATININE: CPT

## 2022-06-27 PROCEDURE — 80050 GENERAL HEALTH PANEL: CPT

## 2022-06-27 PROCEDURE — 80069 RENAL FUNCTION PANEL: CPT

## 2022-06-27 PROCEDURE — 36415 COLL VENOUS BLD VENIPUNCTURE: CPT

## 2022-06-27 PROCEDURE — 82607 VITAMIN B-12: CPT

## 2022-06-27 PROCEDURE — 80061 LIPID PANEL: CPT

## 2022-06-27 PROCEDURE — 82043 UR ALBUMIN QUANTITATIVE: CPT

## 2022-06-27 PROCEDURE — 83036 HEMOGLOBIN GLYCOSYLATED A1C: CPT

## 2022-06-27 PROCEDURE — 81003 URINALYSIS AUTO W/O SCOPE: CPT

## 2022-06-27 PROCEDURE — 82306 VITAMIN D 25 HYDROXY: CPT

## 2022-06-27 PROCEDURE — 83970 ASSAY OF PARATHORMONE: CPT

## 2022-06-27 PROCEDURE — 84156 ASSAY OF PROTEIN URINE: CPT

## 2022-06-28 LAB
ALBUMIN UR-MCNC: <1.2 MG/DL
CREAT UR-MCNC: 132.6 MG/DL
MICROALBUMIN/CREAT UR: NORMAL MG/G{CREAT}

## 2022-07-01 ENCOUNTER — OFFICE VISIT (OUTPATIENT)
Dept: ENDOCRINOLOGY | Facility: CLINIC | Age: 52
End: 2022-07-01

## 2022-07-01 VITALS
BODY MASS INDEX: 44.98 KG/M2 | OXYGEN SATURATION: 98 % | WEIGHT: 270 LBS | HEART RATE: 75 BPM | HEIGHT: 65 IN | DIASTOLIC BLOOD PRESSURE: 60 MMHG | SYSTOLIC BLOOD PRESSURE: 130 MMHG

## 2022-07-01 DIAGNOSIS — I15.2 HYPERTENSION ASSOCIATED WITH DIABETES: ICD-10-CM

## 2022-07-01 DIAGNOSIS — E55.9 VITAMIN D DEFICIENCY: ICD-10-CM

## 2022-07-01 DIAGNOSIS — E11.59 HYPERTENSION ASSOCIATED WITH DIABETES: ICD-10-CM

## 2022-07-01 DIAGNOSIS — E78.2 MIXED DIABETIC HYPERLIPIDEMIA ASSOCIATED WITH TYPE 1 DIABETES MELLITUS: ICD-10-CM

## 2022-07-01 DIAGNOSIS — E10.69 MIXED DIABETIC HYPERLIPIDEMIA ASSOCIATED WITH TYPE 1 DIABETES MELLITUS: ICD-10-CM

## 2022-07-01 DIAGNOSIS — E10.21 DIABETIC NEPHROPATHY ASSOCIATED WITH TYPE 1 DIABETES MELLITUS: ICD-10-CM

## 2022-07-01 DIAGNOSIS — E53.8 B12 DEFICIENCY: ICD-10-CM

## 2022-07-01 DIAGNOSIS — E10.42 DIABETIC POLYNEUROPATHY ASSOCIATED WITH TYPE 1 DIABETES MELLITUS: ICD-10-CM

## 2022-07-01 DIAGNOSIS — E10.65 TYPE 1 DIABETES MELLITUS WITH HYPERGLYCEMIA: Primary | ICD-10-CM

## 2022-07-01 PROCEDURE — 95251 CONT GLUC MNTR ANALYSIS I&R: CPT | Performed by: INTERNAL MEDICINE

## 2022-07-01 PROCEDURE — 99214 OFFICE O/P EST MOD 30 MIN: CPT | Performed by: INTERNAL MEDICINE

## 2022-07-01 RX ORDER — FUROSEMIDE 20 MG/1
TABLET ORAL
Qty: 15 TABLET | Refills: 11 | Status: SHIPPED | OUTPATIENT
Start: 2022-07-01

## 2022-07-01 RX ORDER — GREEN TEA/HOODIA GORDONII 315-12.5MG
CAPSULE ORAL
Qty: 15 TABLET | Refills: 11
Start: 2022-07-01

## 2022-07-01 NOTE — PROGRESS NOTES
"  Subjective    Jazminedennis Rodriguez is a 52 y.o. female. she is here today for follow-up of type 1 diabetes         History of Present Illness    T1DM  more than 10 y complicated by CKD, retinopathy and recently developed neuropathy      Uses u500 insulin through pump , much improved now that on Tslim X2 Control IQ    Main symptom is weight gain and edema    PE    /60   Pulse 75   Ht 165.1 cm (65\")   Wt 122 kg (270 lb)   SpO2 98%   BMI 44.93 kg/m²   AOx3  No visible goiter  RRR  CTA   Edema    Labs    Lab Results   Component Value Date    WBC 9.55 06/27/2022    HGB 12.0 06/27/2022    HCT 35.8 06/27/2022    MCV 86.1 06/27/2022     06/27/2022     Lab Results   Component Value Date    GLUCOSE 135 (H) 06/27/2022    BUN 23 (H) 06/27/2022    CREATININE 1.21 (H) 06/27/2022    EGFRIFNONA 46 (L) 11/19/2021    BCR 19.0 06/27/2022    K 4.7 06/27/2022    CO2 21.1 (L) 06/27/2022    CALCIUM 9.7 06/27/2022    ALBUMIN 4.00 06/27/2022    AST 22 06/27/2022    ALT 20 06/27/2022         Assessment & Plan      1. Type 1 diabetes mellitus with hyperglycemia (HCC)    2. Diabetic nephropathy associated with type 1 diabetes mellitus (HCC)    3. Diabetic polyneuropathy associated with type 1 diabetes mellitus (HCC)    4. Mixed diabetic hyperlipidemia associated with type 1 diabetes mellitus (HCC)    5. Hypertension associated with diabetes (HCC)    6. Vitamin D deficiency    7. B12 deficiency    .  T1DM    Glycemic management   Joanie 2 weeks reviewed  Type 1 dm w hyperglycemia              Lab Results   Component Value Date    HGBA1C 8.60 (H) 06/27/2022    HGBA1C 9.46 (H) 11/19/2021    HGBA1C 8.60 (H) 08/24/2020       Tslim X2 , Control IQ - CGM interpretation - Type 1 DM w hyperglycemia            Pump Changes detailed above , they consisted of increasing basal by 0.05 , decreasing correction by 10 and carb ratio by 2  She is entering carbs and control IQ aiding    victoza and sglt2 side effects  Afrezza was approved but " "never used, prefers to hold off on this . This would help mitigate the early ppg spike    She is developing edema from improved diabetes control, I added lasix , next appt rediscuss jardiance                   Lipid Management     Lab Results   Component Value Date    CHOL 144 06/27/2022    CHLPL 178 10/28/2016    TRIG 152 (H) 06/27/2022    HDL 36 (L) 06/27/2022    LDL 81 06/27/2022        Lipitor 40 mg tablet       Blood Pressure Management:    /60   Pulse 75   Ht 165.1 cm (65\")   Wt 122 kg (270 lb)   SpO2 98%   BMI 44.93 kg/m²     Losartan and metoprolol      Microvascular Complication Monitoring:     nephropathy ckd stage III, stable - following w nephrology, no albuminuria      Developed neuropathy - add alpha lipoic acid, b12 and benfotiamine       had vitreous hemorrhage, stopped aspirin , now stable disease    Weight Related:   Sleep apnea positive     she will have cpap titration     should use compression stockings    Gave lasix    --  start contrave - not effective     exercising and limiting carbs but still not able to lose weight    I want to rediscuss sglt2 I and glp 1           Bone Health      Lab Results   Component Value Date    TTDR36XK 40.5 06/27/2022    CITI99KN 47.9 11/19/2021    OUJK13FW 35.9 08/24/2020            MNG with subcm nodules     US personally reviewed , stable from May 2013 to Nov 2016     Lab Results   Component Value Date    TSH 1.990 06/27/2022     Lab Results   Component Value Date    ATQVJRSE79 329 06/27/2022          5-14 no celiac disease          "

## 2022-07-06 RX ORDER — INSULIN HUMAN 500 [IU]/ML
INJECTION, SOLUTION SUBCUTANEOUS
Qty: 40 ML | Refills: 11 | Status: SHIPPED | OUTPATIENT
Start: 2022-07-06

## 2022-07-15 ENCOUNTER — OFFICE VISIT (OUTPATIENT)
Dept: ENDOCRINOLOGY | Facility: CLINIC | Age: 52
End: 2022-07-15

## 2022-07-15 DIAGNOSIS — E10.3399: ICD-10-CM

## 2022-07-15 PROCEDURE — 98960 EDU&TRN PT SELF-MGMT NQHP 1: CPT | Performed by: DIETITIAN, REGISTERED

## 2022-07-15 NOTE — PROGRESS NOTES
Jazmine Rodriguez is a 52 y.o. female referred for outpatient diabetes education by Dr. Allred. Patient attended individual education for 30 minutes on 07/15/2022. Topics covered included:     1. Healthy Food Choices   i. Provided patient with detailed carbohydrate counting guide.    ii. Instructed patient to eat 30-45 grams of carbohydrate with each meal (2-3 exchange choices) and 15 grams of carb for snacks (1 exchange choices).   iii. Provided patient with list of non-starchy vegetables and foods that are low in carbohydrate for snacks and to incorporate with meals (Planning Healthy Meals Packet).    iv. Choose fruits, vegetables, whole grains, legumes, low-fat milk, fiber-rich foods, minimal saturated fats, and watch cholesterol and sodium intake.    v. Reviewed carbohydrate-containing foods, standard serving sizes, and measuring foods.   vi. Reviewed the difference between simple and complex carbohydrate. Encouraged patient to choose complex carbohydrates more often.      2. Adjusted correction factor for BG to 1 unit for every 50 over 110. [employer]Connect reports scanned into file.     Provided patient with Diabetes and You book, and Planning Healthy Meals book. Provided handout of sample menu with carbs listed.      Thank you Dr. Allred for this referral.        MCKINLEY Dewey, RD, LD

## 2022-08-11 ENCOUNTER — DOCUMENTATION (OUTPATIENT)
Dept: ENDOCRINOLOGY | Facility: CLINIC | Age: 52
End: 2022-08-11

## 2022-08-11 ENCOUNTER — TELEPHONE (OUTPATIENT)
Dept: ENDOCRINOLOGY | Facility: CLINIC | Age: 52
End: 2022-08-11

## 2022-08-11 RX ORDER — PROCHLORPERAZINE 25 MG/1
1 SUPPOSITORY RECTAL
Qty: 1 EACH | Refills: 3 | Status: SHIPPED | OUTPATIENT
Start: 2022-08-11

## 2022-08-11 RX ORDER — PROCHLORPERAZINE 25 MG/1
SUPPOSITORY RECTAL AS NEEDED
Qty: 9 EACH | Refills: 3 | Status: SHIPPED | OUTPATIENT
Start: 2022-08-11

## 2022-08-11 NOTE — TELEPHONE ENCOUNTER
PT called and is needing a refill on Rx: Continuous Blood Gluc SensorTransmitter (Dexcom G6 Transmitter)      Pharmacy Info:  [facility] - [place], [place] - [address] [place] [address] [place] [facility] [place] [address] [place] [phone] PH - [phone] [facility], [facility] [address]   Phone:  [phone]  Fax:  [phone]

## 2022-09-07 ENCOUNTER — OFFICE VISIT (OUTPATIENT)
Dept: SLEEP MEDICINE | Facility: HOSPITAL | Age: 52
End: 2022-09-07

## 2022-09-07 VITALS
WEIGHT: 273.3 LBS | HEART RATE: 81 BPM | SYSTOLIC BLOOD PRESSURE: 156 MMHG | BODY MASS INDEX: 45.54 KG/M2 | HEIGHT: 65 IN | DIASTOLIC BLOOD PRESSURE: 59 MMHG | OXYGEN SATURATION: 95 %

## 2022-09-07 DIAGNOSIS — G47.33 OSA (OBSTRUCTIVE SLEEP APNEA): Primary | ICD-10-CM

## 2022-09-07 PROCEDURE — 99212 OFFICE O/P EST SF 10 MIN: CPT | Performed by: NURSE PRACTITIONER

## 2022-09-07 RX ORDER — ERGOCALCIFEROL 1.25 MG/1
50000 CAPSULE ORAL
COMMUNITY

## 2022-09-07 NOTE — PROGRESS NOTES
Sleep Clinic Follow Up    Date: 2022  Primary Care Provider: Roderick Marx MD    Last office visit: 2021 (I reviewed this note)    CC: Follow up: ARLENE on CPAP, annual       Interim History:  Since the last visit:    1) severe ARLENE -  Jazmine Rodriguez has remained compliant with CPAP. She denies mask and machine issues, dry mouth, headaches, or pressures intolerance. She denies abnormal dreams, sleep paralysis, nasal congestion, URI sx.  Overall doing well.     2) Patient denies RLS symptoms.    Sleep Testin. Split night PSG on 2013, AHI of 30.6 recommended 11 cm H2O   2. Currently on 11 cm H2O    PAP Data:    Time frame: 09/10/2021-2022   Compliance: 99 %  Average use on days used: 7hrs 52 min  Percent of days with usage greater than or equal to 4 hours: 99%  PAP range: 11 cm H2O  Average 90% pressure: 11 cmH2O  Leak: 14.4 L/ minutes  Average AHI: 0.6 events/hr  Mask type: Nasal mask  DME: [facility]     Bed time:   Sleep latency: 30 minutes  Number of times awakens during the night: 2-3  Wake time: 0530  Estimated total sleep time at night: 7-8 hours  Caffeine intake: 2 cups of coffee, 0-1 cups of tea, and 0 sodas per day  Alcohol intake: 0 drinks per week  Nap time: denies    Sleepiness with Driving: denies      [place] - 4        PMHx, FH, SH reviewed and pertinent changes are:  unchanged from last office visit on 2021      REVIEW OF SYSTEMS:   Negative for chest pain, SOA, fever, chills, cough, N/V/D, abdominal pain.    Smoking:none         Exam:  Vitals:    22 1623   BP: 156/59   Pulse: 81   SpO2: 95%           22  1623   Weight: 124 kg (273 lb 4.8 oz)     Body mass index is 45.48 kg/m². Class 3 Severe Obesity (BMI >=40). Obesity-related health conditions include the following: obstructive sleep apnea. Obesity is unchanged. BMI is is above average; BMI management plan is completed. We discussed portion control and increasing exercise.      Gen:                 No distress, conversant, pleasant, appears stated age, alert, oriented  Eyes:               Anicteric sclera, moist conjunctiva, no lid lag                           EOMI   Lungs:             normal effort, non-labored breathing                          Clear to auscultation bilaterally          CV:                  Normal S1/S2, no murmur                          no lower extremity edema                 Psych:             Appropriate affect  Neuro:             CN 2-12 appear intact    Past Medical History:   Diagnosis Date   • Allergic rhinitis    • Astigmatism    • Borderline glaucoma    • Diabetic retinopathy (HCC)     prob PDR OD   • Dyslipidemia    • Essential hypertension    • GERD (gastroesophageal reflux disease)    • Nonproliferative diabetic retinopathy (HCC)     possible occult PDR OD      • Puncture wound without foreign body, right lower leg, initial encounter    • Sinus headache    • Type 1 diabetes mellitus (HCC)    • Type 1 diabetes mellitus with stage 3 chronic kidney disease (HCC) 4/20/2018   • Vitreous hemorrhage (HCC)     s/p vitrectomy OD, doing well          Current Outpatient Medications:   •  atorvastatin (LIPITOR) 40 MG tablet, TAKE ONE TABLET BY MOUTH DAILY, Disp: 90 tablet, Rfl: 3  •  Continuous Blood Gluc Sensor (Dexcom G6 Sensor), Use as directed for continuous glucose monitoring **Change sensor every 10 days**, Disp: 9 each, Rfl: 3  •  Continuous Blood Gluc Transmit (Dexcom G6 Transmitter) misc, 1 each Every 3 (Three) Months. E10.65, Disp: 1 each, Rfl: 3  •  Cyanocobalamin (B-12) 500 MCG sublingual tablet, 500 mcgs under the tongue every other day, Disp: 15 tablet, Rfl: 11  •  furosemide (Lasix) 20 MG tablet, Take half a tab daily PRN edema, Disp: 15 tablet, Rfl: 11  •  insulin regular (HUMULIN R) 500 UNIT/ML CONCENTRATED injection, INJECT 1 TO 1.3 ML'S VIA PUMP DAILY AS DIRECTED, Disp: 40 mL, Rfl: 11  •  levonorgestrel (MIRENA) 20 MCG/24HR IUD, 1 each by Intrauterine route 1 (One)  Time., Disp: , Rfl:   •  metoprolol succinate XL (TOPROL-XL) 25 MG 24 hr tablet, Take 1 tablet by mouth Daily., Disp: 90 tablet, Rfl: 3  •  ONE TOUCH ULTRA TEST test strip, TEST 4 TIMES DAILY, Disp: 400 each, Rfl: 3  •  ascorbic acid (VITAMIN C) 1000 MG tablet, Take 1,000 mg by mouth Daily., Disp: , Rfl:   •  ergocalciferol (ERGOCALCIFEROL) 1.25 MG (87756 UT) capsule, Take 50,000 Units by mouth., Disp: , Rfl:   •  losartan (COZAAR) 100 MG tablet, TAKE ONE TABLET BY MOUTH DAILY, Disp: 90 tablet, Rfl: 3  •  Zinc 50 MG capsule, Take 50 mg by mouth Daily., Disp: , Rfl:       Assessment and Plan:    1. Obstructive sleep apnea    1. Compliant with PAP therapy  2. Continue PAP as prescribed  3. Script for PAP supplies  4. Drowsy driving tips- do not drive if feeling sleepy   5. Return to clinic in 12 months with compliance report unless change in symptoms in interim period  2. Morbid obesity           I spent 10 minutes caring for Jazmine on this date of service. This time includes time spent by me in the following activities: preparing for the visit, obtaining and/or reviewing a separately obtained history, performing a medically appropriate examination and/or evaluation, counseling and educating the patient/family/caregiver, ordering medications, tests, or procedures and documenting information in the medical record.           This document has been electronically signed by ADALBERTO Reich on September 7, 2022 16:28 CDT            CC: Roderick Marx MD          No ref. provider found

## 2022-09-22 ENCOUNTER — HOSPITAL ENCOUNTER (EMERGENCY)
Facility: HOSPITAL | Age: 52
Discharge: HOME | End: 2022-09-22
Attending: STUDENT IN AN ORGANIZED HEALTH CARE EDUCATION/TRAINING PROGRAM | Admitting: EMERGENCY MEDICINE

## 2022-09-22 VITALS
TEMPERATURE: 98.3 F | DIASTOLIC BLOOD PRESSURE: 72 MMHG | RESPIRATION RATE: 18 BRPM | BODY MASS INDEX: 41.65 KG/M2 | WEIGHT: 250 LBS | HEIGHT: 65 IN | OXYGEN SATURATION: 98 % | HEART RATE: 80 BPM | SYSTOLIC BLOOD PRESSURE: 164 MMHG

## 2022-09-22 DIAGNOSIS — T85.694A INSULIN PUMP MECHANICAL COMPLICATION, INITIAL ENCOUNTER: ICD-10-CM

## 2022-09-22 DIAGNOSIS — R73.9 HYPERGLYCEMIA: Primary | ICD-10-CM

## 2022-09-22 LAB
ALBUMIN SERPL-MCNC: 4.4 G/DL (ref 3.5–5.2)
ALBUMIN/GLOB SERPL: 1.3 G/DL
ALP SERPL-CCNC: 115 U/L (ref 39–117)
ALT SERPL W P-5'-P-CCNC: 25 U/L (ref 1–33)
ANION GAP SERPL CALCULATED.3IONS-SCNC: 11 MMOL/L (ref 5–15)
AST SERPL-CCNC: 16 U/L (ref 1–32)
BASOPHILS # BLD AUTO: 0.07 10*3/MM3 (ref 0–0.2)
BASOPHILS NFR BLD AUTO: 0.8 % (ref 0–1.5)
BILIRUB SERPL-MCNC: 1.1 MG/DL (ref 0–1.2)
BILIRUB UR QL STRIP: NEGATIVE
BUN SERPL-MCNC: 25 MG/DL (ref 6–20)
BUN/CREAT SERPL: 15.6 (ref 7–25)
CALCIUM SPEC-SCNC: 9.7 MG/DL (ref 8.6–10.5)
CHLORIDE SERPL-SCNC: 99 MMOL/L (ref 98–107)
CLARITY UR: CLEAR
CO2 SERPL-SCNC: 28 MMOL/L (ref 22–29)
COLOR UR: YELLOW
CREAT SERPL-MCNC: 1.6 MG/DL (ref 0.57–1)
DEPRECATED RDW RBC AUTO: 38.8 FL (ref 37–54)
EGFRCR SERPLBLD CKD-EPI 2021: 38.6 ML/MIN/1.73
EOSINOPHIL # BLD AUTO: 0.32 10*3/MM3 (ref 0–0.4)
EOSINOPHIL NFR BLD AUTO: 3.6 % (ref 0.3–6.2)
ERYTHROCYTE [DISTWIDTH] IN BLOOD BY AUTOMATED COUNT: 12.5 % (ref 12.3–15.4)
GLOBULIN UR ELPH-MCNC: 3.4 GM/DL
GLUCOSE BLDC GLUCOMTR-MCNC: 349 MG/DL (ref 70–130)
GLUCOSE BLDC GLUCOMTR-MCNC: 392 MG/DL (ref 70–130)
GLUCOSE SERPL-MCNC: 547 MG/DL (ref 65–99)
GLUCOSE UR STRIP-MCNC: ABNORMAL MG/DL
HCT VFR BLD AUTO: 36.9 % (ref 34–46.6)
HGB BLD-MCNC: 12.3 G/DL (ref 12–15.9)
HGB UR QL STRIP.AUTO: NEGATIVE
HOLD SPECIMEN: NORMAL
IMM GRANULOCYTES # BLD AUTO: 0.05 10*3/MM3 (ref 0–0.05)
IMM GRANULOCYTES NFR BLD AUTO: 0.6 % (ref 0–0.5)
KETONES UR QL STRIP: NEGATIVE
LEUKOCYTE ESTERASE UR QL STRIP.AUTO: NEGATIVE
LYMPHOCYTES # BLD AUTO: 1.71 10*3/MM3 (ref 0.7–3.1)
LYMPHOCYTES NFR BLD AUTO: 19.4 % (ref 19.6–45.3)
MCH RBC QN AUTO: 28.6 PG (ref 26.6–33)
MCHC RBC AUTO-ENTMCNC: 33.3 G/DL (ref 31.5–35.7)
MCV RBC AUTO: 85.8 FL (ref 79–97)
MONOCYTES # BLD AUTO: 0.52 10*3/MM3 (ref 0.1–0.9)
MONOCYTES NFR BLD AUTO: 5.9 % (ref 5–12)
NEUTROPHILS NFR BLD AUTO: 6.16 10*3/MM3 (ref 1.7–7)
NEUTROPHILS NFR BLD AUTO: 69.7 % (ref 42.7–76)
NITRITE UR QL STRIP: NEGATIVE
NRBC BLD AUTO-RTO: 0 /100 WBC (ref 0–0.2)
PH UR STRIP.AUTO: 7 [PH] (ref 5–9)
PLATELET # BLD AUTO: 218 10*3/MM3 (ref 140–450)
PMV BLD AUTO: 10.4 FL (ref 6–12)
POTASSIUM SERPL-SCNC: 5.2 MMOL/L (ref 3.5–5.2)
PROT SERPL-MCNC: 7.8 G/DL (ref 6–8.5)
PROT UR QL STRIP: NEGATIVE
RBC # BLD AUTO: 4.3 10*6/MM3 (ref 3.77–5.28)
SODIUM SERPL-SCNC: 138 MMOL/L (ref 136–145)
SP GR UR STRIP: 1.02 (ref 1–1.03)
UROBILINOGEN UR QL STRIP: ABNORMAL
WBC NRBC COR # BLD: 8.83 10*3/MM3 (ref 3.4–10.8)
WHOLE BLOOD HOLD COAG: NORMAL

## 2022-09-22 PROCEDURE — 80053 COMPREHEN METABOLIC PANEL: CPT

## 2022-09-22 PROCEDURE — 85025 COMPLETE CBC W/AUTO DIFF WBC: CPT

## 2022-09-22 PROCEDURE — 63710000001 INSULIN REGULAR HUMAN PER 5 UNITS

## 2022-09-22 PROCEDURE — 82962 GLUCOSE BLOOD TEST: CPT

## 2022-09-22 PROCEDURE — 81003 URINALYSIS AUTO W/O SCOPE: CPT

## 2022-09-22 PROCEDURE — 99283 EMERGENCY DEPT VISIT LOW MDM: CPT

## 2022-09-22 RX ORDER — SODIUM CHLORIDE 0.9 % (FLUSH) 0.9 %
10 SYRINGE (ML) INJECTION AS NEEDED
Status: DISCONTINUED | OUTPATIENT
Start: 2022-09-22 | End: 2022-09-22 | Stop reason: HOSPADM

## 2022-09-22 RX ORDER — METOPROLOL SUCCINATE 25 MG/1
25 TABLET, EXTENDED RELEASE ORAL ONCE
Status: COMPLETED | OUTPATIENT
Start: 2022-09-22 | End: 2022-09-22

## 2022-09-22 RX ADMIN — SODIUM CHLORIDE 500 ML: 9 INJECTION, SOLUTION INTRAVENOUS at 20:21

## 2022-09-22 RX ADMIN — METOPROLOL SUCCINATE 25 MG: 25 TABLET, FILM COATED, EXTENDED RELEASE ORAL at 20:21

## 2022-09-22 RX ADMIN — SODIUM CHLORIDE 500 ML: 9 INJECTION, SOLUTION INTRAVENOUS at 18:54

## 2022-09-22 RX ADMIN — HUMAN INSULIN 10 UNITS: 100 INJECTION, SOLUTION SUBCUTANEOUS at 18:56

## 2022-09-22 NOTE — ED PROVIDER NOTES
Subjective   History of Present Illness  52 year old female patient presents to ER for complaint of high blood sugar. She wears an insulin pump and had changed her port this am at 0600. She gets a basal rate and boluses for meals of U500 insulin. She reports that after a large lunch her BS was over 400 and she tried to bolus. Later her meter would not read over 400 and and when she got home to check with another meter it was 589. She removed her new insulin port and found the small needle to be bent. She changed her port and pump is functioning correctly but she does not have any fast acting insulin at home to correct for the 10 hours she was without insulin. She reports that she became dizzy with blurred vision and slurred speech. She denies any nausea or vomiting. She denies tobacco, ETOH, or illicit drug use.         Review of Systems   Constitutional: Negative.    HENT: Negative.    Eyes: Negative.    Respiratory: Negative.    Cardiovascular: Negative.    Gastrointestinal: Negative.    Endocrine: Negative.    Genitourinary: Negative.    Musculoskeletal: Negative.    Skin: Negative.    Allergic/Immunologic: Negative.    Neurological: Positive for dizziness and speech difficulty.   Hematological: Negative.    Psychiatric/Behavioral: Negative.        Past Medical History:   Diagnosis Date   • Allergic rhinitis    • Astigmatism    • Borderline glaucoma    • Diabetic retinopathy (HCC)     prob PDR OD   • Dyslipidemia    • Essential hypertension    • GERD (gastroesophageal reflux disease)    • Nonproliferative diabetic retinopathy (HCC)     possible occult PDR OD      • Puncture wound without foreign body, right lower leg, initial encounter    • Sinus headache    • Type 1 diabetes mellitus (HCC)    • Type 1 diabetes mellitus with stage 3 chronic kidney disease (HCC) 4/20/2018   • Vitreous hemorrhage (HCC)     s/p vitrectomy OD, doing well          Allergies   Allergen Reactions   • Sulfa Antibiotics Shortness Of  "Breath       Past Surgical History:   Procedure Laterality Date   • BLADDER SURGERY Bilateral 1973    Bilateral refulx, with progressive deterioration of upper tracts.   •  SECTION  2009    Emergent primary low transverse  section. Intrauterine pregnancy. Chronic hypertension. Superimposed preecalmpsia. Insulin dependent diabetes.   • CYSTOSCOPY  1972    And internal urethrotomy. Bilateral reflux with Walker's membrane.   • INJECTION OF MEDICATION  2016    Kenalog (2)     • INJECTION OF MEDICATION  10/23/2015    Toradol (1)    • PAP SMEAR  2009    Negative       Family History   Problem Relation Age of Onset   • Cancer Paternal Grandfather         Colorectal Cancer   • Diabetes Other    • Hypertension Other    • Thyroid disease Other    • Breast cancer Mother    • Breast cancer Paternal Aunt        Social History     Socioeconomic History   • Marital status:    Tobacco Use   • Smoking status: Never Smoker   • Smokeless tobacco: Never Used           Objective    /72 (BP Location: Left arm, Patient Position: Lying)   Pulse 80   Temp 98.3 °F (36.8 °C) (Oral)   Resp 18   Ht 165.1 cm (65\")   Wt 113 kg (250 lb)   SpO2 98%   BMI 41.60 kg/m²     Physical Exam  Vitals and nursing note reviewed.   Constitutional:       General: She is not in acute distress.     Appearance: Normal appearance. She is not ill-appearing, toxic-appearing or diaphoretic.   HENT:      Head: Normocephalic.      Nose: Nose normal.      Mouth/Throat:      Mouth: Mucous membranes are moist.   Eyes:      Pupils: Pupils are equal, round, and reactive to light.   Cardiovascular:      Rate and Rhythm: Normal rate and regular rhythm.      Pulses: Normal pulses.      Heart sounds: Normal heart sounds.   Pulmonary:      Effort: Pulmonary effort is normal.      Breath sounds: Normal breath sounds.   Abdominal:      General: Bowel sounds are normal.      Palpations: Abdomen is soft. "   Musculoskeletal:         General: Normal range of motion.      Cervical back: Normal range of motion.   Skin:     General: Skin is warm and dry.      Capillary Refill: Capillary refill takes less than 2 seconds.   Neurological:      Mental Status: She is alert and oriented to person, place, and time.   Psychiatric:         Mood and Affect: Mood normal.         Behavior: Behavior normal.         Thought Content: Thought content normal.         Judgment: Judgment normal.         Procedures           ED Course  ED Course as of 09/26/22 2100   Thu Sep 22, 2022   2012 Spoke with patient about her results and treatment plan including plan for discharge. She verbalizes understanding and is agreeable with plan. [HS]      ED Course User Index  [HS] Kelsi Starks, ADALBERTO           Results for orders placed or performed during the hospital encounter of 09/22/22   Comprehensive Metabolic Panel    Specimen: Blood   Result Value Ref Range    Glucose 547 (C) 65 - 99 mg/dL    BUN 25 (H) 6 - 20 mg/dL    Creatinine 1.60 (H) 0.57 - 1.00 mg/dL    Sodium 138 136 - 145 mmol/L    Potassium 5.2 3.5 - 5.2 mmol/L    Chloride 99 98 - 107 mmol/L    CO2 28.0 22.0 - 29.0 mmol/L    Calcium 9.7 8.6 - 10.5 mg/dL    Total Protein 7.8 6.0 - 8.5 g/dL    Albumin 4.40 3.50 - 5.20 g/dL    ALT (SGPT) 25 1 - 33 U/L    AST (SGOT) 16 1 - 32 U/L    Alkaline Phosphatase 115 39 - 117 U/L    Total Bilirubin 1.1 0.0 - 1.2 mg/dL    Globulin 3.4 gm/dL    A/G Ratio 1.3 g/dL    BUN/Creatinine Ratio 15.6 7.0 - 25.0    Anion Gap 11.0 5.0 - 15.0 mmol/L    eGFR 38.6 (L) >60.0 mL/min/1.73   Urinalysis With Microscopic If Indicated (No Culture) - Urine, Clean Catch    Specimen: Urine, Clean Catch   Result Value Ref Range    Color, UA Yellow Yellow, Straw, Dark Yellow, [place]    Appearance, UA Clear Clear    pH, UA 7.0 5.0 - 9.0    Specific Gravity, UA 1.020 1.003 - 1.030    Glucose,  mg/dL (2+) (A) Negative    Ketones, UA Negative Negative    Bilirubin, UA  Negative Negative    Blood, UA Negative Negative    Protein, UA Negative Negative    Leuk Esterase, UA Negative Negative    Nitrite, UA Negative Negative    Urobilinogen, UA 0.2 E.U./dL 0.2 - 1.0 E.U./dL   CBC Auto Differential    Specimen: Blood   Result Value Ref Range    WBC 8.83 3.40 - 10.80 10*3/mm3    RBC 4.30 3.77 - 5.28 10*6/mm3    Hemoglobin 12.3 12.0 - 15.9 g/dL    Hematocrit 36.9 34.0 - 46.6 %    MCV 85.8 79.0 - 97.0 fL    MCH 28.6 26.6 - 33.0 pg    MCHC 33.3 31.5 - 35.7 g/dL    RDW 12.5 12.3 - 15.4 %    RDW-SD 38.8 37.0 - 54.0 fl    MPV 10.4 6.0 - 12.0 fL    Platelets 218 140 - 450 10*3/mm3    Neutrophil % 69.7 42.7 - 76.0 %    Lymphocyte % 19.4 (L) 19.6 - 45.3 %    Monocyte % 5.9 5.0 - 12.0 %    Eosinophil % 3.6 0.3 - 6.2 %    Basophil % 0.8 0.0 - 1.5 %    Immature Grans % 0.6 (H) 0.0 - 0.5 %    Neutrophils, Absolute 6.16 1.70 - 7.00 10*3/mm3    Lymphocytes, Absolute 1.71 0.70 - 3.10 10*3/mm3    Monocytes, Absolute 0.52 0.10 - 0.90 10*3/mm3    Eosinophils, Absolute 0.32 0.00 - 0.40 10*3/mm3    Basophils, Absolute 0.07 0.00 - 0.20 10*3/mm3    Immature Grans, Absolute 0.05 0.00 - 0.05 10*3/mm3    nRBC 0.0 0.0 - 0.2 /100 WBC   POC Glucose Once    Specimen: Blood   Result Value Ref Range    Glucose 392 (H) 70 - 130 mg/dL   POC Glucose Once    Specimen: Blood   Result Value Ref Range    Glucose 349 (H) 70 - 130 mg/dL   POC Glucose Once    Specimen: Blood   Result Value Ref Range    Glucose 527 (C) 70 - 130 mg/dL   Gold Top - SST   Result Value Ref Range    Extra Tube Hold for add-ons.    Light Blue Top   Result Value Ref Range    Extra Tube Hold for add-ons.                                      MDM    Final diagnoses:   Hyperglycemia   Insulin pump mechanical complication, initial encounter       ED Disposition  ED Disposition     ED Disposition   Discharge    Condition   Stable    Comment   --             Roderick Marx MD  [facility] [address] [facility]  [id number]  [phone]      ER follow up, As needed         Medication List      No changes were made to your prescriptions during this visit.          Kelsi Starks, APRN  09/26/22 2100

## 2022-09-23 NOTE — DISCHARGE INSTRUCTIONS
Home to rest. Continue your home medications as prescribed. You received your Metoprolol while in the ER. Follow up with your primary care provider as needed. Return for any further complications.

## 2022-09-24 LAB — GLUCOSE BLDC GLUCOMTR-MCNC: 527 MG/DL (ref 70–130)

## 2022-11-09 RX ORDER — LOSARTAN POTASSIUM 100 MG/1
TABLET ORAL
Qty: 90 TABLET | Refills: 3 | Status: SHIPPED | OUTPATIENT
Start: 2022-11-09

## 2022-11-09 RX ORDER — ATORVASTATIN CALCIUM 40 MG/1
TABLET, FILM COATED ORAL
Qty: 90 TABLET | Refills: 3 | Status: SHIPPED | OUTPATIENT
Start: 2022-11-09

## 2022-11-10 ENCOUNTER — DOCUMENTATION (OUTPATIENT)
Dept: ENDOCRINOLOGY | Facility: CLINIC | Age: 52
End: 2022-11-10

## 2022-11-21 ENCOUNTER — DOCUMENTATION (OUTPATIENT)
Dept: ENDOCRINOLOGY | Facility: CLINIC | Age: 52
End: 2022-11-21

## 2022-11-28 ENCOUNTER — TRANSCRIBE ORDERS (OUTPATIENT)
Dept: LAB | Facility: HOSPITAL | Age: 52
End: 2022-11-28

## 2022-11-28 DIAGNOSIS — N18.30 STAGE 3 CHRONIC KIDNEY DISEASE, UNSPECIFIED WHETHER STAGE 3A OR 3B CKD: Primary | ICD-10-CM

## 2022-12-02 RX ORDER — METOPROLOL SUCCINATE 25 MG/1
TABLET, EXTENDED RELEASE ORAL
Qty: 90 TABLET | Refills: 3 | Status: SHIPPED | OUTPATIENT
Start: 2022-12-02

## 2022-12-28 ENCOUNTER — DOCUMENTATION (OUTPATIENT)
Dept: ENDOCRINOLOGY | Facility: CLINIC | Age: 52
End: 2022-12-28

## 2022-12-29 ENCOUNTER — LAB (OUTPATIENT)
Dept: LAB | Facility: HOSPITAL | Age: 52
End: 2022-12-29
Payer: COMMERCIAL

## 2022-12-29 DIAGNOSIS — E10.65 TYPE 1 DIABETES MELLITUS WITH HYPERGLYCEMIA: ICD-10-CM

## 2022-12-29 DIAGNOSIS — I15.2 HYPERTENSION ASSOCIATED WITH DIABETES: ICD-10-CM

## 2022-12-29 DIAGNOSIS — E10.69 MIXED DIABETIC HYPERLIPIDEMIA ASSOCIATED WITH TYPE 1 DIABETES MELLITUS: ICD-10-CM

## 2022-12-29 DIAGNOSIS — E10.42 DIABETIC POLYNEUROPATHY ASSOCIATED WITH TYPE 1 DIABETES MELLITUS: ICD-10-CM

## 2022-12-29 DIAGNOSIS — E55.9 VITAMIN D DEFICIENCY: ICD-10-CM

## 2022-12-29 DIAGNOSIS — E53.8 B12 DEFICIENCY: ICD-10-CM

## 2022-12-29 DIAGNOSIS — E10.21 DIABETIC NEPHROPATHY ASSOCIATED WITH TYPE 1 DIABETES MELLITUS: ICD-10-CM

## 2022-12-29 DIAGNOSIS — E11.59 HYPERTENSION ASSOCIATED WITH DIABETES: ICD-10-CM

## 2022-12-29 DIAGNOSIS — E78.2 MIXED DIABETIC HYPERLIPIDEMIA ASSOCIATED WITH TYPE 1 DIABETES MELLITUS: ICD-10-CM

## 2022-12-29 LAB
ALBUMIN SERPL-MCNC: 4.3 G/DL (ref 3.5–5.2)
ALBUMIN/GLOB SERPL: 1.5 G/DL
ALP SERPL-CCNC: 104 U/L (ref 39–117)
ALT SERPL W P-5'-P-CCNC: 25 U/L (ref 1–33)
ANION GAP SERPL CALCULATED.3IONS-SCNC: 9 MMOL/L (ref 5–15)
AST SERPL-CCNC: 18 U/L (ref 1–32)
BASOPHILS # BLD AUTO: 0.06 10*3/MM3 (ref 0–0.2)
BASOPHILS NFR BLD AUTO: 0.7 % (ref 0–1.5)
BILIRUB SERPL-MCNC: 0.7 MG/DL (ref 0–1.2)
BUN SERPL-MCNC: 22 MG/DL (ref 6–20)
BUN/CREAT SERPL: 17.6 (ref 7–25)
CALCIUM SPEC-SCNC: 9.5 MG/DL (ref 8.6–10.5)
CHLORIDE SERPL-SCNC: 106 MMOL/L (ref 98–107)
CHOLEST SERPL-MCNC: 145 MG/DL (ref 0–200)
CO2 SERPL-SCNC: 26 MMOL/L (ref 22–29)
CREAT SERPL-MCNC: 1.25 MG/DL (ref 0.57–1)
DEPRECATED RDW RBC AUTO: 39.9 FL (ref 37–54)
EGFRCR SERPLBLD CKD-EPI 2021: 52 ML/MIN/1.73
EOSINOPHIL # BLD AUTO: 0.47 10*3/MM3 (ref 0–0.4)
EOSINOPHIL NFR BLD AUTO: 5.4 % (ref 0.3–6.2)
ERYTHROCYTE [DISTWIDTH] IN BLOOD BY AUTOMATED COUNT: 12.8 % (ref 12.3–15.4)
GLOBULIN UR ELPH-MCNC: 2.8 GM/DL
GLUCOSE SERPL-MCNC: 89 MG/DL (ref 65–99)
HBA1C MFR BLD: 7.3 % (ref 4.8–5.6)
HCT VFR BLD AUTO: 36.8 % (ref 34–46.6)
HDLC SERPL-MCNC: 33 MG/DL (ref 40–60)
HGB BLD-MCNC: 11.8 G/DL (ref 12–15.9)
IMM GRANULOCYTES # BLD AUTO: 0.03 10*3/MM3 (ref 0–0.05)
IMM GRANULOCYTES NFR BLD AUTO: 0.3 % (ref 0–0.5)
LDLC SERPL CALC-MCNC: 85 MG/DL (ref 0–100)
LDLC/HDLC SERPL: 2.45 {RATIO}
LYMPHOCYTES # BLD AUTO: 2.76 10*3/MM3 (ref 0.7–3.1)
LYMPHOCYTES NFR BLD AUTO: 31.8 % (ref 19.6–45.3)
MCH RBC QN AUTO: 27.8 PG (ref 26.6–33)
MCHC RBC AUTO-ENTMCNC: 32.1 G/DL (ref 31.5–35.7)
MCV RBC AUTO: 86.6 FL (ref 79–97)
MONOCYTES # BLD AUTO: 0.61 10*3/MM3 (ref 0.1–0.9)
MONOCYTES NFR BLD AUTO: 7 % (ref 5–12)
NEUTROPHILS NFR BLD AUTO: 4.76 10*3/MM3 (ref 1.7–7)
NEUTROPHILS NFR BLD AUTO: 54.8 % (ref 42.7–76)
NRBC BLD AUTO-RTO: 0 /100 WBC (ref 0–0.2)
PLATELET # BLD AUTO: 226 10*3/MM3 (ref 140–450)
PMV BLD AUTO: 10.4 FL (ref 6–12)
POTASSIUM SERPL-SCNC: 4.2 MMOL/L (ref 3.5–5.2)
PROT SERPL-MCNC: 7.1 G/DL (ref 6–8.5)
RBC # BLD AUTO: 4.25 10*6/MM3 (ref 3.77–5.28)
SODIUM SERPL-SCNC: 141 MMOL/L (ref 136–145)
TRIGL SERPL-MCNC: 156 MG/DL (ref 0–150)
TSH SERPL DL<=0.05 MIU/L-ACNC: 1.82 UIU/ML (ref 0.27–4.2)
VLDLC SERPL-MCNC: 27 MG/DL (ref 5–40)
WBC NRBC COR # BLD: 8.69 10*3/MM3 (ref 3.4–10.8)

## 2022-12-29 PROCEDURE — 82607 VITAMIN B-12: CPT

## 2022-12-29 PROCEDURE — 36415 COLL VENOUS BLD VENIPUNCTURE: CPT

## 2022-12-29 PROCEDURE — 80061 LIPID PANEL: CPT

## 2022-12-29 PROCEDURE — 82043 UR ALBUMIN QUANTITATIVE: CPT

## 2022-12-29 PROCEDURE — 83036 HEMOGLOBIN GLYCOSYLATED A1C: CPT

## 2022-12-29 PROCEDURE — 82306 VITAMIN D 25 HYDROXY: CPT

## 2022-12-29 PROCEDURE — 85025 COMPLETE CBC W/AUTO DIFF WBC: CPT

## 2022-12-29 PROCEDURE — 82570 ASSAY OF URINE CREATININE: CPT

## 2022-12-29 PROCEDURE — 84443 ASSAY THYROID STIM HORMONE: CPT

## 2022-12-29 PROCEDURE — 80053 COMPREHEN METABOLIC PANEL: CPT

## 2022-12-29 PROCEDURE — 84156 ASSAY OF PROTEIN URINE: CPT

## 2022-12-30 LAB
25(OH)D3 SERPL-MCNC: 46.6 NG/ML (ref 30–100)
ALBUMIN UR-MCNC: <1.2 MG/DL
CREAT UR-MCNC: 103.1 MG/DL
CREAT UR-MCNC: 105.3 MG/DL
MICROALBUMIN/CREAT UR: NORMAL MG/G{CREAT}
PROT ?TM UR-MCNC: 7.7 MG/DL
PROT/CREAT UR: 74.7 MG/G CREA (ref 0–200)
VIT B12 BLD-MCNC: 1030 PG/ML (ref 211–946)

## 2023-01-03 ENCOUNTER — OFFICE VISIT (OUTPATIENT)
Dept: ENDOCRINOLOGY | Facility: CLINIC | Age: 53
End: 2023-01-03
Payer: COMMERCIAL

## 2023-01-03 VITALS
BODY MASS INDEX: 47.47 KG/M2 | OXYGEN SATURATION: 100 % | WEIGHT: 278.06 LBS | SYSTOLIC BLOOD PRESSURE: 150 MMHG | DIASTOLIC BLOOD PRESSURE: 60 MMHG | HEART RATE: 87 BPM | HEIGHT: 64 IN

## 2023-01-03 DIAGNOSIS — E11.59 HYPERTENSION ASSOCIATED WITH DIABETES: ICD-10-CM

## 2023-01-03 DIAGNOSIS — E10.21 DIABETIC NEPHROPATHY ASSOCIATED WITH TYPE 1 DIABETES MELLITUS: ICD-10-CM

## 2023-01-03 DIAGNOSIS — E10.3399: Primary | ICD-10-CM

## 2023-01-03 DIAGNOSIS — E10.69 MIXED DIABETIC HYPERLIPIDEMIA ASSOCIATED WITH TYPE 1 DIABETES MELLITUS: ICD-10-CM

## 2023-01-03 DIAGNOSIS — I15.2 HYPERTENSION ASSOCIATED WITH DIABETES: ICD-10-CM

## 2023-01-03 DIAGNOSIS — E78.2 MIXED DIABETIC HYPERLIPIDEMIA ASSOCIATED WITH TYPE 1 DIABETES MELLITUS: ICD-10-CM

## 2023-01-03 DIAGNOSIS — E53.8 B12 DEFICIENCY: ICD-10-CM

## 2023-01-03 PROCEDURE — 99214 OFFICE O/P EST MOD 30 MIN: CPT | Performed by: INTERNAL MEDICINE

## 2023-01-03 PROCEDURE — 1160F RVW MEDS BY RX/DR IN RCRD: CPT | Performed by: INTERNAL MEDICINE

## 2023-01-03 PROCEDURE — 95251 CONT GLUC MNTR ANALYSIS I&R: CPT | Performed by: INTERNAL MEDICINE

## 2023-01-03 PROCEDURE — 1159F MED LIST DOCD IN RCRD: CPT | Performed by: INTERNAL MEDICINE

## 2023-01-03 NOTE — PROGRESS NOTES
Subjective    Jazmine Rodriguez is a 52 y.o. female. she is here today for follow-up of type 1 diabetes         History of Present Illness    T1DM  more than 10 y complicated by CKD, retinopathy and  neuropathy      Uses u500 insulin through pump , much improved now that on Tslim X2 Control IQ but lately experiencing hypoglycemia soon after waking up    Main symptom is weight gain     PE    /60   Pulse 87   Ht 162.6 cm (64\")   Wt 126 kg (278 lb 1 oz)   SpO2 100%   BMI 47.73 kg/m²   AOx3  No visible goiter  RRR  CTA   Edema    Labs    Lab Results   Component Value Date    WBC 8.69 12/29/2022    HGB 11.8 (L) 12/29/2022    HCT 36.8 12/29/2022    MCV 86.6 12/29/2022     12/29/2022     Lab Results   Component Value Date    GLUCOSE 89 12/29/2022    BUN 22 (H) 12/29/2022    CREATININE 1.25 (H) 12/29/2022    EGFRIFNONA 46 (L) 11/19/2021    BCR 17.6 12/29/2022    K 4.2 12/29/2022    CO2 26.0 12/29/2022    CALCIUM 9.5 12/29/2022    ALBUMIN 4.3 12/29/2022    AST 18 12/29/2022    ALT 25 12/29/2022         Assessment & Plan      1. Type 1 diabetes mellitus with moderate nonproliferative retinopathy, macular edema presence unspecified, unspecified laterality (HCC)    2. Mixed diabetic hyperlipidemia associated with type 1 diabetes mellitus (HCC)    3. Hypertension associated with diabetes (HCC)    4. B12 deficiency    5. Diabetic nephropathy associated with type 1 diabetes mellitus (HCC)    .  T1DM    Glycemic management   Joanie 2 weeks reviewed  Type 1 dm w hyperglycemia              Lab Results   Component Value Date    HGBA1C 7.30 (H) 12/29/2022    HGBA1C 8.60 (H) 06/27/2022    HGBA1C 9.46 (H) 11/19/2021       Tslim X2 , Control IQ - CGM interpretation - Type 1 DM w hyperglycemia but much improved             victoza and sglt2 side effects  Afrezza was approved but side effects    She is having low glucose in the early morning.    This should be corrected by sleep mode since its date micro boluses of U5 100  during the night that are lingering into the early morning hours.    In addition I suggested exercise mode as soon as she wakes up until 10 AM that we will raise the target for the early morning hours    I had Mounjaro for type 2 DM component             Lipid Management     Lab Results   Component Value Date    CHOL 145 12/29/2022    CHLPL 178 10/28/2016    TRIG 156 (H) 12/29/2022    HDL 33 (L) 12/29/2022    LDL 85 12/29/2022        Lipitor 40 mg tablet       Blood Pressure Management:    /60   Pulse 87   Ht 162.6 cm (64\")   Wt 126 kg (278 lb 1 oz)   SpO2 100%   BMI 47.73 kg/m²     Losartan and metoprolol      Microvascular Complication Monitoring:     nephropathy , without albuminuria        Developed neuropathy - add alpha lipoic acid, b12 and benfotiamine       had vitreous hemorrhage, stopped aspirin , now stable disease    Weight Related:   Sleep apnea positive     she will have cpap titration     should use compression stockings    Gave lasix    --  start contrave - not effective     exercising and limiting carbs but still not able to lose weight    I want to rediscuss sglt2 I and glp 1           Bone Health      Lab Results   Component Value Date    XDTI26UE 46.6 12/29/2022    EAYQ78ZW 40.5 06/27/2022    HYHG88GF 47.9 11/19/2021            MNG with subcm nodules     US personally reviewed , stable from May 2013 to Nov 2016     Lab Results   Component Value Date    TSH 1.820 12/29/2022     Lab Results   Component Value Date    [id number] 1,030 (H) 12/29/2022          5-14 no celiac disease              This document has been electronically signed by Roderick Allred MD on January 13, 2023 17:54 CST

## 2023-02-01 ENCOUNTER — TELEPHONE (OUTPATIENT)
Dept: ENDOCRINOLOGY | Facility: CLINIC | Age: 53
End: 2023-02-01
Payer: COMMERCIAL

## 2023-02-01 RX ORDER — TIRZEPATIDE 5 MG/.5ML
5 INJECTION, SOLUTION SUBCUTANEOUS
Qty: 2 ML | Refills: 11 | Status: SHIPPED | OUTPATIENT
Start: 2023-02-01

## 2023-02-01 NOTE — TELEPHONE ENCOUNTER
Pt called and stated Dr Woods had given her a sample of 2.5 Mounjaro to see if she would be able to tolerate it. Pt says it is working fine for her and would like at RX sent to [facility] pharmacy.    Thanks

## 2023-02-27 ENCOUNTER — TREATMENT (OUTPATIENT)
Dept: ENDOCRINOLOGY | Facility: CLINIC | Age: 53
End: 2023-02-27
Payer: COMMERCIAL

## 2023-02-27 ENCOUNTER — TELEPHONE (OUTPATIENT)
Dept: ENDOCRINOLOGY | Facility: CLINIC | Age: 53
End: 2023-02-27

## 2023-02-27 DIAGNOSIS — E10.649 TYPE 1 DIABETES MELLITUS WITH HYPOGLYCEMIA AND WITHOUT COMA: Primary | ICD-10-CM

## 2023-02-27 PROCEDURE — 95251 CONT GLUC MNTR ANALYSIS I&R: CPT | Performed by: INTERNAL MEDICINE

## 2023-02-27 NOTE — PROGRESS NOTES
Joanie interpretation     Patient reached out, having hypoglycemia      ICD-10-CM ICD-9-CM   1. Type 1 diabetes mellitus with hypoglycemia and without coma ([facility])  E10.649 250.81                     Dexcom interpreted      Ambulatory Glucose Profile Report    Days Analyzed : 2 week period ending on 02/27/23      Continuous Glucose Monitory Device:  Dexcom G6     -   - 38% high target range  - 57% in target range  - 5% below target range  - GMI 8.2 %  - Average glucose 265 mg/dl    Interpretation : Diabetes Type 1 w hypoglcyemia     New plan   We changed settings as follows     All day long , basal 0.275, carb ratio 15    If this fails, change to U100           This document has been electronically signed by Roderick Allred MD on February 27, 2023 15:51 CST

## 2023-02-27 NOTE — TELEPHONE ENCOUNTER
Patient called and stated she is having low sugar in the 40's with her pump. She has recently gone on the Mounjaro and states it has really cut her appetite back. She also stated she has been surviving on skittles and Coke for the last few weeks to keep her sugar up enough. Patient also states she does not wish to speak with Cong as she felt she knew more about her pump than he did and has no confidence in him regarding her treatment or pump adjustments. Is requesting call back to help figure out what is going on.    Phone [phone]    Thank you

## 2023-07-05 LAB
ALBUMIN SERPL-MCNC: 3.8 G/DL (ref 3.5–5.2)
ALBUMIN/GLOB SERPL: 1.2 G/DL
ALP SERPL-CCNC: 110 U/L (ref 39–117)
ALT SERPL W P-5'-P-CCNC: 21 U/L (ref 1–33)
ANION GAP SERPL CALCULATED.3IONS-SCNC: 11 MMOL/L (ref 5–15)
AST SERPL-CCNC: 18 U/L (ref 1–32)
BASOPHILS # BLD AUTO: 0.07 10*3/MM3 (ref 0–0.2)
BASOPHILS NFR BLD AUTO: 0.8 % (ref 0–1.5)
BILIRUB SERPL-MCNC: 0.8 MG/DL (ref 0–1.2)
BUN SERPL-MCNC: 20 MG/DL (ref 6–20)
BUN/CREAT SERPL: 16 (ref 7–25)
CALCIUM SPEC-SCNC: 9.4 MG/DL (ref 8.6–10.5)
CHLORIDE SERPL-SCNC: 106 MMOL/L (ref 98–107)
CHOLEST SERPL-MCNC: 158 MG/DL (ref 0–200)
CO2 SERPL-SCNC: 23 MMOL/L (ref 22–29)
CREAT SERPL-MCNC: 1.25 MG/DL (ref 0.57–1)
DEPRECATED RDW RBC AUTO: 39 FL (ref 37–54)
EGFRCR SERPLBLD CKD-EPI 2021: 51.6 ML/MIN/1.73
EOSINOPHIL # BLD AUTO: 0.52 10*3/MM3 (ref 0–0.4)
EOSINOPHIL NFR BLD AUTO: 5.7 % (ref 0.3–6.2)
ERYTHROCYTE [DISTWIDTH] IN BLOOD BY AUTOMATED COUNT: 12.5 % (ref 12.3–15.4)
GLOBULIN UR ELPH-MCNC: 3.3 GM/DL
GLUCOSE SERPL-MCNC: 150 MG/DL (ref 65–99)
HBA1C MFR BLD: 7.5 % (ref 4.8–5.6)
HCT VFR BLD AUTO: 35.9 % (ref 34–46.6)
HDLC SERPL-MCNC: 34 MG/DL (ref 40–60)
HGB BLD-MCNC: 11.7 G/DL (ref 12–15.9)
IMM GRANULOCYTES # BLD AUTO: 0.04 10*3/MM3 (ref 0–0.05)
IMM GRANULOCYTES NFR BLD AUTO: 0.4 % (ref 0–0.5)
LDLC SERPL CALC-MCNC: 103 MG/DL (ref 0–100)
LDLC/HDLC SERPL: 2.96 {RATIO}
LYMPHOCYTES # BLD AUTO: 2.52 10*3/MM3 (ref 0.7–3.1)
LYMPHOCYTES NFR BLD AUTO: 27.4 % (ref 19.6–45.3)
MCH RBC QN AUTO: 27.9 PG (ref 26.6–33)
MCHC RBC AUTO-ENTMCNC: 32.6 G/DL (ref 31.5–35.7)
MCV RBC AUTO: 85.5 FL (ref 79–97)
MONOCYTES # BLD AUTO: 0.57 10*3/MM3 (ref 0.1–0.9)
MONOCYTES NFR BLD AUTO: 6.2 % (ref 5–12)
NEUTROPHILS NFR BLD AUTO: 5.47 10*3/MM3 (ref 1.7–7)
NEUTROPHILS NFR BLD AUTO: 59.5 % (ref 42.7–76)
NRBC BLD AUTO-RTO: 0 /100 WBC (ref 0–0.2)
PLATELET # BLD AUTO: 236 10*3/MM3 (ref 140–450)
PMV BLD AUTO: 10.8 FL (ref 6–12)
POTASSIUM SERPL-SCNC: 4.7 MMOL/L (ref 3.5–5.2)
PROT SERPL-MCNC: 7.1 G/DL (ref 6–8.5)
RBC # BLD AUTO: 4.2 10*6/MM3 (ref 3.77–5.28)
SODIUM SERPL-SCNC: 140 MMOL/L (ref 136–145)
TRIGL SERPL-MCNC: 117 MG/DL (ref 0–150)
TSH SERPL DL<=0.05 MIU/L-ACNC: 1.82 UIU/ML (ref 0.27–4.2)
VIT B12 BLD-MCNC: 1624 PG/ML (ref 211–946)
VLDLC SERPL-MCNC: 21 MG/DL (ref 5–40)
WBC NRBC COR # BLD: 9.19 10*3/MM3 (ref 3.4–10.8)

## 2023-07-31 ENCOUNTER — TRANSCRIBE ORDERS (OUTPATIENT)
Dept: LAB | Facility: HOSPITAL | Age: 53
End: 2023-07-31
Payer: COMMERCIAL

## 2023-07-31 DIAGNOSIS — N18.30 STAGE 3 CHRONIC KIDNEY DISEASE, UNSPECIFIED WHETHER STAGE 3A OR 3B CKD: Primary | ICD-10-CM

## 2023-09-22 ENCOUNTER — OFFICE VISIT (OUTPATIENT)
Dept: SLEEP MEDICINE | Facility: HOSPITAL | Age: 53
End: 2023-09-22
Payer: COMMERCIAL

## 2023-09-22 VITALS
HEART RATE: 78 BPM | WEIGHT: 270.8 LBS | OXYGEN SATURATION: 95 % | BODY MASS INDEX: 45.12 KG/M2 | HEIGHT: 65 IN | DIASTOLIC BLOOD PRESSURE: 61 MMHG | SYSTOLIC BLOOD PRESSURE: 149 MMHG

## 2023-09-22 DIAGNOSIS — G47.33 OSA (OBSTRUCTIVE SLEEP APNEA): Primary | ICD-10-CM

## 2023-09-22 PROCEDURE — 99213 OFFICE O/P EST LOW 20 MIN: CPT | Performed by: NURSE PRACTITIONER

## 2023-09-22 NOTE — PROGRESS NOTES
Sleep Clinic Follow Up    Date: 9/22/2023  Primary Care Provider: Roderick Marx MD    Last office visit: 09/7/2022 (I reviewed this note)    CC: Follow up: ARLENE on CPAP, annual       Interim History:  Since the last visit:    1) severe ARLENE -  Jazmine Rodriguez has remained compliant with CPAP. She denies mask and machine issues, dry mouth, headaches, or pressures intolerance. She denies abnormal dreams, sleep paralysis, nasal congestion, URI sx.  Doing well no complaints.     2) Patient denies RLS symptoms.   Sleep Testing:    Split night PSG on 05/09/2013, AHI of 30   CPAP titration , recommended 11 cm H2O   Currently on 11 cm H2O    PAP Data:    Time frame: 09/21/2022-09/19/2023   Compliance: 100 %  Average use on days used: 7hrs 51 min  Percent of days with usage greater than or equal to 4 hours: 100%  PAP range: 11 cm H2O  Average 90% pressure: 11 cmH2O  Leak: 11 L/ minutes  Average AHI: 0.8 events/hr  Machine: resmed with modem   Mask type: Nasal mask  DME: [facility]     Bed time: 2200  Sleep latency: 30 minutes  Number of times awakens during the night: 1-2  Wake time: 5048-6602  Estimated total sleep time at night: 6-7 hours  Caffeine intake: 1 cups of coffee, 0 cups of tea, and 0 sodas per day  Alcohol intake: 2 drinks per week  Nap time: denies    Sleepiness with Driving: denies        [place] Sleepiness Scale Score: 4    How likely are you to doze off or fall asleep in the following situation, in contrast to feeling just tired?     Use the following scale to choose the most appropriate number for each situation:    0 = would never doze  1 = slight chance of dozing   2 = moderate chance of dozing   3 = high chance of dozing    It is important that you answer each question as best you can.      Situation       Chance of Dozing (0-3)    Sitting and reading            ___1____    Watching TV          ___0____    Sitting, inactive in a public place (e.g. a theatre or meeting)   ___0____    As a passenger  in a car for an hour without break    ___1____    Lying down to rest in the afternoon, when circumstances permit  ___1____    Sitting and talking to someone      ___0____    Sitting quietly after a lunch without alcohol     ___1____    In a car, while stopped for a few minutes in traffic    ___0____         PMHx, FH, SH reviewed and pertinent changes are: cataract surgery       REVIEW OF SYSTEMS:   Negative for chest pain, SOA, fever, chills, cough, N/V/D, abdominal pain.    Smoking:none         Exam:  Vitals:    09/22/23 1547   BP: 149/61   Pulse: 78   SpO2: 95%           09/22/23  1547   Weight: 123 kg (270 lb 12.8 oz)     Body mass index is 45.06 kg/m².  Obesity-related health conditions include the following: obstructive sleep apnea. Obesity is unchanged. BMI is is above average; BMI management plan is completed. Recommend portion control and increasing exercise.       Gen:                No distress, conversant, pleasant, appears stated age, alert, oriented  Eyes:               Anicteric sclera, moist conjunctiva, no lid lag                           EOMI   Lungs:             normal effort, non-labored breathing                          Clear to auscultation bilaterally          CV:                  Normal S1/S2, no murmur                          no lower extremity edema                 Psych:             Appropriate affect  Neuro:             CN 2-12 appear intact    Past Medical History:   Diagnosis Date    Allergic rhinitis     Astigmatism     Borderline glaucoma     Diabetic retinopathy     prob PDR OD    Dyslipidemia     Essential hypertension     GERD (gastroesophageal reflux disease)     Nonproliferative diabetic retinopathy     possible occult PDR OD       Puncture wound without foreign body, right lower leg, initial encounter     Sinus headache     Type 1 diabetes mellitus     Type 1 diabetes mellitus with stage 3 chronic kidney disease 4/20/2018    Vitreous hemorrhage     s/p vitrectomy OD, doing  well          Current Outpatient Medications:     ascorbic acid (VITAMIN C) 1000 MG tablet, Take 1 tablet by mouth Daily., Disp: , Rfl:     atorvastatin (LIPITOR) 40 MG tablet, Take 1 tablet by mouth Daily., Disp: 90 tablet, Rfl: 3    Continuous Blood Gluc Sensor (Dexcom G6 Sensor), Use as directed for continuous glucose monitoring **Change sensor every 10 days**, Disp: 9 each, Rfl: 3    Continuous Blood Gluc Transmit (Dexcom G6 Transmitter) misc, 1 each Every 3 (Three) Months. E10.65, Disp: 1 each, Rfl: 3    ergocalciferol (ERGOCALCIFEROL) 1.25 MG (89533 UT) capsule, Take 1 capsule by mouth., Disp: , Rfl:     furosemide (Lasix) 20 MG tablet, Take half a tab daily PRN edema, Disp: 15 tablet, Rfl: 11    insulin regular (HUMULIN R) 500 UNIT/ML CONCENTRATED injection, INJECT 1 TO 1.3 ML'S VIA PUMP DAILY AS DIRECTED, Disp: 40 mL, Rfl: 11    levonorgestrel (MIRENA) 20 MCG/24HR IUD, 1 each by Intrauterine route 1 (One) Time., Disp: , Rfl:     losartan (COZAAR) 100 MG tablet, Take 1 tablet by mouth Daily., Disp: 90 tablet, Rfl: 3    metoprolol succinate XL (TOPROL-XL) 25 MG 24 hr tablet, Take 1 tablet by mouth Daily., Disp: 90 tablet, Rfl: 3    ONE TOUCH ULTRA TEST test strip, TEST 4 TIMES DAILY, Disp: 400 each, Rfl: 3    Zinc 50 MG capsule, Take 50 mg by mouth Daily., Disp: , Rfl:   WBC   Date Value Ref Range Status   07/05/2023 9.19 3.40 - 10.80 10*3/mm3 Final     RBC   Date Value Ref Range Status   07/05/2023 4.20 3.77 - 5.28 10*6/mm3 Final     Hemoglobin   Date Value Ref Range Status   07/05/2023 11.7 (L) 12.0 - 15.9 g/dL Final     Hematocrit   Date Value Ref Range Status   07/05/2023 35.9 34.0 - 46.6 % Final     MCV   Date Value Ref Range Status   07/05/2023 85.5 79.0 - 97.0 fL Final     MCH   Date Value Ref Range Status   07/05/2023 27.9 26.6 - 33.0 pg Final     MCHC   Date Value Ref Range Status   07/05/2023 32.6 31.5 - 35.7 g/dL Final     RDW   Date Value Ref Range Status   07/05/2023 12.5 12.3 - 15.4 % Final      RDW-SD   Date Value Ref Range Status   07/05/2023 39.0 37.0 - 54.0 fl Final     MPV   Date Value Ref Range Status   07/05/2023 10.8 6.0 - 12.0 fL Final     Platelets   Date Value Ref Range Status   07/05/2023 236 140 - 450 10*3/mm3 Final     Neutrophil %   Date Value Ref Range Status   07/05/2023 59.5 42.7 - 76.0 % Final     Lymphocyte %   Date Value Ref Range Status   07/05/2023 27.4 19.6 - 45.3 % Final     Monocyte %   Date Value Ref Range Status   07/05/2023 6.2 5.0 - 12.0 % Final     Eosinophil %   Date Value Ref Range Status   07/05/2023 5.7 0.3 - 6.2 % Final     Basophil %   Date Value Ref Range Status   07/05/2023 0.8 0.0 - 1.5 % Final     Immature Grans %   Date Value Ref Range Status   07/05/2023 0.4 0.0 - 0.5 % Final     Neutrophils, Absolute   Date Value Ref Range Status   07/05/2023 5.47 1.70 - 7.00 10*3/mm3 Final     Lymphocytes, Absolute   Date Value Ref Range Status   07/05/2023 2.52 0.70 - 3.10 10*3/mm3 Final     Monocytes, Absolute   Date Value Ref Range Status   07/05/2023 0.57 0.10 - 0.90 10*3/mm3 Final     Eosinophils, Absolute   Date Value Ref Range Status   07/05/2023 0.52 (H) 0.00 - 0.40 10*3/mm3 Final     Basophils, Absolute   Date Value Ref Range Status   07/05/2023 0.07 0.00 - 0.20 10*3/mm3 Final     Immature Grans, Absolute   Date Value Ref Range Status   07/05/2023 0.04 0.00 - 0.05 10*3/mm3 Final     nRBC   Date Value Ref Range Status   07/05/2023 0.0 0.0 - 0.2 /100 WBC Final       Lab Results   Component Value Date    GLUCOSE 150 (H) 07/05/2023    BUN 20 07/05/2023    CREATININE 1.25 (H) 07/05/2023    EGFR 51.6 (L) 07/05/2023    BCR 16.0 07/05/2023    K 4.7 07/05/2023    CO2 23.0 07/05/2023    CALCIUM 9.4 07/05/2023    ALBUMIN 3.8 07/05/2023    BILITOT 0.8 07/05/2023    AST 18 07/05/2023    ALT 21 07/05/2023         Assessment and Plan:    Obstructive sleep apnea- Established, stable   Compliant with PAP therapy- compliance report reviewed with patient   Continue PAP as  prescribed  Script for PAP supplies  Pertinent labs reviewed   Drowsy driving tips- do not drive if feeling sleepy   Return to clinic in 12 months with compliance report unless change in symptoms in interim period  Morbid obesity           Educated on PAP management, maintenance, and compliance.           This document has been electronically signed by ADALBERTO Reich on September 22, 2023 15:59 CDT            CC: Roderick Marx MD          No ref. provider found

## 2023-12-27 NOTE — PROGRESS NOTES
Subjective    Jazmine Rodriguez is a 48 y.o. female. she is here today for follow-up.    Diabetes   Pertinent negatives for hypoglycemia include no confusion, dizziness, headaches, nervousness/anxiousness, pallor, seizures, speech difficulty or tremors. Pertinent negatives for diabetes include no chest pain, no fatigue, no polydipsia, no polyphagia, no polyuria and no weakness.           History of Present Illness      followup for thyroid nodule and  diabetes type 1     In regards to thyroid nodule      Duration - diagnosed 40s  Timing - constant  Quality -    controlled  Severity - moderate     Previous Imaging -  last US from Nov 2016 right sup 1.1. cm thyroid nodule , stable when compared to 2013     TSH - Normal      She refers Hashimoto's , TPO ab neg . She is euthyroid.      ---     Type 1 DM     Duration since 10 y   Timing constant  Quality uncontrolled  Severity high - diabetic retinopathy and ckd  Alleviating Factors - Insulin, through insulin pump   Associated symptoms - fatigue           Ho  vitreous hemorrhage related in part to aspirin             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 (CMS/HCC)    • Diabetic retinopathy (CMS/HCC)     prob PDR OD   • Dyslipidemia    • Essential hypertension    • GERD (gastroesophageal reflux disease)    • Non-toxic multinodular goiter    • Nonproliferative diabetic retinopathy (CMS/HCC)     possible occult PDR OD      • Puncture wound without foreign body, right lower leg, initial encounter    • Referred otalgia    • Sinus headache    • Type 1 diabetes mellitus (CMS/HCC)    • Type 2 diabetes  mellitus (CMS/HCC)    • Upper respiratory infection    • Vitreous hemorrhage (CMS/HCC)     s/p vitrectomy OD, doing well        Past Surgical History:   Procedure Laterality Date   • BLADDER SURGERY Bilateral 1973    Bilateral refulx, with progressive deterioration of upper tracts.   •  SECTION  2009    Emergent primary low transverse  section. Intrauterine pregnancy. Chronic hypertension. Superimposed preecalmpsia. Insulin dependent diabetes.   • CYSTOSCOPY  1972    And internal urethrotomy. Bilateral reflux with Walker's membrane.   • INJECTION OF MEDICATION  2016    Kenalog (2)     • INJECTION OF MEDICATION  10/23/2015    Toradol (1)    • PAP SMEAR  2009    Negative     Family History   Problem Relation Age of Onset   • Cancer Paternal Grandfather         Colorectal Cancer   • Diabetes Other    • Hypertension Other    • Thyroid disease Other    • Breast cancer Mother    • Breast cancer Paternal Aunt      OB History      Para Term  AB Living    1 1 1          SAB TAB Ectopic Molar Multiple Live Births                       Current Outpatient Medications   Medication Sig Dispense Refill   • atorvastatin (LIPITOR) 40 MG tablet TAKE 1 TABLET BY MOUTH  DAILY 90 tablet 1   • Continuous Blood Gluc Sensor ([employer] YIFAN SENSOR SYSTEM) misc Inject 1 each under the skin As Needed (every 10 days). NDC [address]-93,ABC[facility]: 12484149,CARDINAL: [id number],[facility]: [id number] 3 each 11   • HUMULIN R 500 UNIT/ML CONCENTRATED injection INJECT 1 TO 1.3 MILLILITERS VIA PUMP DAILY 5 vial 3   • levonorgestrel (MIRENA) 20 MCG/24HR IUD 1 each by Intrauterine route 1 (One) Time.     • losartan (COZAAR) 100 MG tablet TAKE 1 TABLET BY MOUTH  DAILY 90 tablet 1   • metoprolol succinate XL (TOPROL-XL) 25 MG 24 hr tablet TAKE 1 TABLET BY MOUTH  DAILY 90 tablet 1   • ONE TOUCH ULTRA TEST test strip TEST 4 TIMES DAILY 400 each 3     No current facility-administered medications for this visit.  "     Allergies   Allergen Reactions   • Sulfa Antibiotics Shortness Of Breath     Social History     Socioeconomic History   • Marital status:      Spouse name: Not on file   • Number of children: Not on file   • Years of education: Not on file   • Highest education level: Not on file   Tobacco Use   • Smoking status: Never Smoker   • Smokeless tobacco: Never Used       Review of Systems  Review of Systems   Constitutional: Negative for activity change, appetite change, diaphoresis and fatigue.   HENT: Negative for congestion, dental problem, drooling, facial swelling, sneezing, sore throat, tinnitus, trouble swallowing and voice change.    Eyes: Negative for photophobia, pain, discharge, redness, itching and visual disturbance.   Respiratory: Negative for apnea, cough, choking, chest tightness and shortness of breath.    Cardiovascular: Negative for chest pain, palpitations and leg swelling.   Gastrointestinal: Negative for abdominal distention, abdominal pain, constipation, diarrhea, nausea and vomiting.   Endocrine: Negative for cold intolerance, heat intolerance, polydipsia, polyphagia and polyuria.   Genitourinary: Negative for difficulty urinating, dysuria, frequency, hematuria and urgency.   Musculoskeletal: Negative for arthralgias, back pain, gait problem, joint swelling, myalgias, neck pain and neck stiffness.   Skin: Negative for color change, pallor, rash and wound.   Allergic/Immunologic: Negative for environmental allergies, food allergies and immunocompromised state.   Neurological: Negative for dizziness, tremors, seizures, facial asymmetry, speech difficulty, weakness, light-headedness, numbness and headaches.   Hematological: Negative for adenopathy. Does not bruise/bleed easily.   Psychiatric/Behavioral: Negative for agitation, behavioral problems, confusion and sleep disturbance. The patient is not nervous/anxious.         Objective    /74   Pulse 82   Ht 165.1 cm (65\")   Wt 117 " kg (257 lb 11.2 oz)   SpO2 98%   BMI 42.88 kg/m²   Physical Exam   Constitutional: She is oriented to person, place, and time. She appears well-developed and well-nourished. No distress.   HENT:   Head: Normocephalic and atraumatic.   Right Ear: External ear normal.   Left Ear: External ear normal.   Nose: Nose normal.   Eyes: Conjunctivae and EOM are normal. Pupils are equal, round, and reactive to light.   Neck: Normal range of motion. Neck supple. No tracheal deviation present. No thyromegaly present.   Cardiovascular: Normal rate, regular rhythm and normal heart sounds.   No murmur heard.  Pulmonary/Chest: Effort normal and breath sounds normal. No respiratory distress. She has no wheezes.   Abdominal: Soft. Bowel sounds are normal. There is no tenderness. There is no rebound and no guarding.   Musculoskeletal: Normal range of motion. She exhibits no edema, tenderness or deformity.   Neurological: She is alert and oriented to person, place, and time. No cranial nerve deficit.   Skin: Skin is warm and dry. No rash noted.   Psychiatric: She has a normal mood and affect. Her behavior is normal. Judgment and thought content normal.       Lab Review  Glucose (mg/dL)   Date Value   01/16/2019 386 (H)   01/08/2019 254 (H)   03/21/2018 264 (H)     Sodium (mmol/L)   Date Value   01/16/2019 131 (L)   01/08/2019 135 (L)   03/21/2018 140     Potassium (mmol/L)   Date Value   01/16/2019 4.9   01/08/2019 4.5   03/21/2018 4.6     Chloride (mmol/L)   Date Value   01/16/2019 98   01/08/2019 100   03/21/2018 99     CO2 (mmol/L)   Date Value   01/16/2019 25.0   01/08/2019 24.0   03/21/2018 25.0     BUN (mg/dL)   Date Value   01/16/2019 18   01/08/2019 21   03/21/2018 22 (H)     Creatinine (mg/dL)   Date Value   01/16/2019 1.24 (H)   01/08/2019 1.28 (H)   03/21/2018 1.09 (H)     Hemoglobin A1C (%)   Date Value   01/08/2019 9.6 (H)   03/21/2018 9.4 (H)   09/11/2017 8.6 (H)     Triglycerides (mg/dL)   Date Value   01/08/2019 136    03/21/2018 143   09/11/2017 96     LDL Cholesterol  (mg/dL)   Date Value   01/08/2019 107   03/21/2018 87   09/11/2017 85       Assessment/Plan      1. Type 1 diabetes mellitus with complication (CMS/[facility])    2. Hypertension associated with diabetes (CMS/[facility])    3. Mixed diabetic hyperlipidemia associated with type 1 diabetes mellitus (CMS/[facility])    4. Nontoxic multinodular goiter    5. Chronic kidney disease (CKD), stage III (moderate) (CMS/[facility])    6. Vitamin D deficiency    7. B12 deficiency    .    Medications prescribed:  Outpatient Encounter Medications as of 2/6/2019   Medication Sig Dispense Refill   • atorvastatin (LIPITOR) 40 MG tablet TAKE 1 TABLET BY MOUTH  DAILY 90 tablet 1   • Continuous Blood Gluc Sensor ([employer] YIFAN SENSOR SYSTEM) misc Inject 1 each under the skin As Needed (every 10 days). NDC [address]-04,ABC8: [id number],[facility]: [id number],[facility]: [id number] 3 each 11   • HUMULIN R 500 UNIT/ML CONCENTRATED injection INJECT 1 TO 1.3 MILLILITERS VIA PUMP DAILY 5 vial 3   • levonorgestrel (MIRENA) 20 MCG/24HR IUD 1 each by Intrauterine route 1 (One) Time.     • losartan (COZAAR) 100 MG tablet TAKE 1 TABLET BY MOUTH  DAILY 90 tablet 1   • metoprolol succinate XL (TOPROL-XL) 25 MG 24 hr tablet TAKE 1 TABLET BY MOUTH  DAILY 90 tablet 1   • ONE TOUCH ULTRA TEST test strip TEST 4 TIMES DAILY 400 each 3     No facility-administered encounter medications on file as of 2/6/2019.        Orders placed during this encounter include:  No orders of the defined types were placed in this encounter.      Glycemic management             Lab Results   Component Value Date    HGBA1C 9.6 (H) 01/08/2019    HGBA1C 9.4 (H) 03/21/2018    HGBA1C 8.6 (H) 09/11/2017     Lab Results   Component Value Date    MICROALBUR 1.3 01/08/2019    CREATININE 1.24 (H) 01/16/2019         pump with  U 500 insulin      Basal insulin      MN 0.375 0.425  6-4 am 0.375 - 0.575 - 0.625 0.675  Noon 0.275 -- 0.225  6 pm 0.375       Carb ratio 9,  correction 70 - turn wizard off    Set doses 13 for bkfast and 8 for supper   Now 13-11 and 10 ( keep 8 )        she has ARLENE and CPAP may help to decrease overnight BG rise     symlin not effective, she stopped    victoza resulted in profound hypoglycemia      No sglt2 I     Pattern from last 2 weeks  High overnight  Low in the mid afternoon    Afraid of evening bolus       Lipid Management     Lab Results   Component Value Date    CHOL 167 01/08/2019    CHOL 155 03/21/2018    CHOL 152 09/11/2017    CHLPL 178 10/28/2016    CHLPL 147 06/20/2016    CHLPL 159 03/17/2016     Lab Results   Component Value Date    TRIG 136 01/08/2019    TRIG 143 03/21/2018    TRIG 96 09/11/2017     Lab Results   Component Value Date    HDL 35 (L) 01/08/2019    HDL 35 (L) 03/21/2018    HDL 34 (L) 09/11/2017     Lab Results   Component Value Date     01/08/2019    LDL 87 03/21/2018    LDL 85 09/11/2017                Lipitor 40 mg tablet, 1 tablet(s) by mouth daily taking      continue     Lipids at goal     Blood Pressure Management:                   cozaar 100 mg daily      metoprolol succinate ER 25 mg tablet,extended release 24 hr, 1 tab daily taking   --     no hctz since Acute on chronic kidney disease     Please refer to nephrology   Microvascular Complication Monitoring:  No Microalbuminuria,  nephropathy ckd stage III, stable      no neuropathy     ==     had vitreous hemorrhage, stop aspirin   Immunizations:  Last influenza immunization 2015, Last pneumococcal immunization has had pneumovax before age 65  Preventive Care:  Patient is not smoking  Weight Related:  Obesity, Counseled on nutrition, Counseled on physical activity  Sleep apnea positive     she will have cpap titration     should use compression stockings     --  start contrave - not effective     exercising and limiting carbs but still not able to lose weight     start saxenda - not covered                      [facility]      Lab Results   Component Value  Date    [id number] 58.7 01/08/2019    KYLC25HD 50.1 03/21/2018    [id number] 70.8 09/11/2017       Other Diabetes Related Aspects  Hashimoto's - no since tpo neg  MNG with subcm nodules     US personally reviewed , stable from May 2013 to Nov 2016     Lab Results   Component Value Date    TSH 1.880 03/21/2018     Lab Results   Component Value Date    [id number] 428 01/08/2019          5-14 no celiac disease          4. Follow-up: No Follow-up on file.                    21-year-old male presents the ED with suicidal thoughts.  Patient states that he underwent a complex jaw surgery 4 months ago but ever since then he has been dissatisfied with the outcome.  His mother is at the bedside states that he has been hyper fixated on his appearance after the surgery and has become obsessive.  Patient states that he is unable to think about anything else and that the obsession has caused him to feel depressed to the point where he has been having suicidal thoughts.  He does not have a specific plan and never had a history of depression formally diagnosed prior to the surgery.  He states that he has been seeing a therapist twice per week with no relief. Denies symptoms of jarod, hearing voices, EtOH abuse, substance abuse. Further denies fever, chills, chest pain, shortness of breath, abdominal pain, nausea, vomiting, diarrhea, constipation, dysuria, hematuria, lower extremity swelling, rash.